# Patient Record
Sex: MALE | Race: WHITE | NOT HISPANIC OR LATINO | Employment: OTHER
[De-identification: names, ages, dates, MRNs, and addresses within clinical notes are randomized per-mention and may not be internally consistent; named-entity substitution may affect disease eponyms.]

---

## 2021-11-04 ENCOUNTER — TELEPHONE (OUTPATIENT)
Dept: MEDICAL GROUP | Facility: CLINIC | Age: 61
End: 2021-11-04

## 2021-11-05 NOTE — TELEPHONE ENCOUNTER
Caller Name: Hugo    Call Back Number: [phone] (home)       How would the patient prefer to be contacted with a response: Phone call OK to leave a detailed message    pt cr asking for a referral to a urologist. I attempted to cb. No answer.

## 2021-12-20 ENCOUNTER — TELEPHONE (OUTPATIENT)
Dept: SCHEDULING | Facility: IMAGING CENTER | Age: 61
End: 2021-12-20

## 2021-12-20 NOTE — TELEPHONE ENCOUNTER
I spoke with Hugo, and he last saw you on 9/28/21. During this visit you mentioned a referral to urology given elevated PSA. He has completed lab results that I have scanned into media manager. He would like a call back to see if he still needs to go to urology. If he does, he needs a new referral.

## 2021-12-30 ENCOUNTER — TELEPHONE (OUTPATIENT)
Dept: MEDICAL GROUP | Facility: CLINIC | Age: 61
End: 2021-12-30

## 2021-12-30 NOTE — TELEPHONE ENCOUNTER
2. SPECIFIC Action To Be Taken: Orders pending, please sign.    3. Diagnosis/Clinical Reason for Request: Elevated PSA history    4. Specialty & Provider Name/Lab/Imaging Location: Urology    5. Is appointment scheduled for requested order/referral: no    Patient was informed they will receive a return phone call from the office ONLY if there are any questions before processing their request. Advised to call back if they haven't received a call from the referral department in 5 days.

## 2022-01-05 NOTE — TELEPHONE ENCOUNTER
Hi Dr. Gould,     I still do not see a referral to urology for Hugo. Can you send this in, or should I call him and tell him he needs an appt with you to get this sent?     Thank you,   Krystyna

## 2022-01-10 DIAGNOSIS — R97.20 PSA ELEVATION: ICD-10-CM

## 2022-02-17 ENCOUNTER — HOSPITAL ENCOUNTER (OUTPATIENT)
Dept: RADIOLOGY | Facility: MEDICAL CENTER | Age: 62
End: 2022-02-17
Attending: UROLOGY
Payer: COMMERCIAL

## 2022-02-17 DIAGNOSIS — R97.20 ELEVATED PROSTATE SPECIFIC ANTIGEN (PSA): ICD-10-CM

## 2022-02-17 PROCEDURE — 700111 HCHG RX REV CODE 636 W/ 250 OVERRIDE (IP): Performed by: RADIOLOGY

## 2022-02-17 PROCEDURE — 700117 HCHG RX CONTRAST REV CODE 255: Performed by: UROLOGY

## 2022-02-17 PROCEDURE — 72197 MRI PELVIS W/O & W/DYE: CPT

## 2022-02-17 PROCEDURE — A9576 INJ PROHANCE MULTIPACK: HCPCS | Performed by: UROLOGY

## 2022-02-17 RX ADMIN — GLUCAGON 1 MG: 1 INJECTION, POWDER, LYOPHILIZED, FOR SOLUTION INTRAMUSCULAR; INTRAVENOUS at 09:15

## 2022-02-17 RX ADMIN — GADOTERIDOL 20 ML: 279.3 INJECTION, SOLUTION INTRAVENOUS at 09:50

## 2022-02-18 ENCOUNTER — HOSPITAL ENCOUNTER (OUTPATIENT)
Dept: LAB | Facility: MEDICAL CENTER | Age: 62
End: 2022-02-18
Attending: UROLOGY
Payer: COMMERCIAL

## 2022-02-18 LAB
APPEARANCE UR: CLEAR
BILIRUB UR QL STRIP.AUTO: NEGATIVE
BUN SERPL-MCNC: 22 MG/DL (ref 8–22)
CAOX CRY #/AREA URNS HPF: ABNORMAL /HPF
COLOR UR: YELLOW
CREAT SERPL-MCNC: 1 MG/DL (ref 0.5–1.4)
GLUCOSE UR STRIP.AUTO-MCNC: NEGATIVE MG/DL
KETONES UR STRIP.AUTO-MCNC: ABNORMAL MG/DL
LEUKOCYTE ESTERASE UR QL STRIP.AUTO: NEGATIVE
MICRO URNS: ABNORMAL
MUCOUS THREADS #/AREA URNS HPF: ABNORMAL /HPF
NITRITE UR QL STRIP.AUTO: NEGATIVE
PH UR STRIP.AUTO: 6.5 [PH] (ref 5–8)
PROT UR QL STRIP: NEGATIVE MG/DL
RBC # URNS HPF: ABNORMAL /HPF
RBC UR QL AUTO: ABNORMAL
SP GR UR STRIP.AUTO: 1.02
UNIDENT CRYS URNS QL MICRO: ABNORMAL /HPF
WBC #/AREA URNS HPF: ABNORMAL /HPF

## 2022-02-18 PROCEDURE — 36415 COLL VENOUS BLD VENIPUNCTURE: CPT

## 2022-02-18 PROCEDURE — 81001 URINALYSIS AUTO W/SCOPE: CPT

## 2022-02-18 PROCEDURE — 82565 ASSAY OF CREATININE: CPT

## 2022-02-18 PROCEDURE — 84520 ASSAY OF UREA NITROGEN: CPT

## 2022-02-18 PROCEDURE — 84153 ASSAY OF PSA TOTAL: CPT

## 2022-02-18 PROCEDURE — 84154 ASSAY OF PSA FREE: CPT

## 2022-02-24 ENCOUNTER — HOSPITAL ENCOUNTER (OUTPATIENT)
Dept: RADIOLOGY | Facility: MEDICAL CENTER | Age: 62
End: 2022-02-24
Attending: UROLOGY
Payer: COMMERCIAL

## 2022-02-24 DIAGNOSIS — R31.29 MICROSCOPIC HEMATURIA: ICD-10-CM

## 2022-02-24 PROCEDURE — 700117 HCHG RX CONTRAST REV CODE 255: Performed by: UROLOGY

## 2022-02-24 PROCEDURE — 74178 CT ABD&PLV WO CNTR FLWD CNTR: CPT

## 2022-02-24 RX ADMIN — IOHEXOL 100 ML: 350 INJECTION, SOLUTION INTRAVENOUS at 14:58

## 2022-03-04 ENCOUNTER — TELEPHONE (OUTPATIENT)
Dept: MEDICAL GROUP | Facility: CLINIC | Age: 62
End: 2022-03-04
Payer: COMMERCIAL

## 2022-03-04 NOTE — TELEPHONE ENCOUNTER
Hugo needs a back dated referral to be sent to his insurance. Can you place a referral to urology that states Dr. Manjit Arshad?     I will call [phone] (his insurance) to give it to them.

## 2022-03-06 DIAGNOSIS — R97.20 PSA ELEVATION: ICD-10-CM

## 2022-04-01 ENCOUNTER — APPOINTMENT (OUTPATIENT)
Dept: RADIOLOGY | Facility: MEDICAL CENTER | Age: 62
End: 2022-04-01
Attending: EMERGENCY MEDICINE
Payer: COMMERCIAL

## 2022-04-01 ENCOUNTER — APPOINTMENT (OUTPATIENT)
Dept: RADIOLOGY | Facility: MEDICAL CENTER | Age: 62
End: 2022-04-01
Payer: COMMERCIAL

## 2022-04-01 ENCOUNTER — HOSPITAL ENCOUNTER (EMERGENCY)
Facility: MEDICAL CENTER | Age: 62
End: 2022-04-01
Attending: EMERGENCY MEDICINE
Payer: COMMERCIAL

## 2022-04-01 VITALS
RESPIRATION RATE: 15 BRPM | WEIGHT: 220.24 LBS | BODY MASS INDEX: 35.4 KG/M2 | SYSTOLIC BLOOD PRESSURE: 123 MMHG | DIASTOLIC BLOOD PRESSURE: 76 MMHG | TEMPERATURE: 97.9 F | OXYGEN SATURATION: 94 % | HEIGHT: 66 IN | HEART RATE: 85 BPM

## 2022-04-01 DIAGNOSIS — Z87.19 HISTORY OF GASTROESOPHAGEAL REFLUX (GERD): ICD-10-CM

## 2022-04-01 DIAGNOSIS — R07.9 CHEST PAIN, UNSPECIFIED TYPE: ICD-10-CM

## 2022-04-01 DIAGNOSIS — R97.20 PSA ELEVATION: ICD-10-CM

## 2022-04-01 LAB
ALBUMIN SERPL BCP-MCNC: 4.5 G/DL (ref 3.2–4.9)
ALBUMIN/GLOB SERPL: 1.8 G/DL
ALP SERPL-CCNC: 87 U/L (ref 30–99)
ALT SERPL-CCNC: 27 U/L (ref 2–50)
ANION GAP SERPL CALC-SCNC: 9 MMOL/L (ref 7–16)
AST SERPL-CCNC: 20 U/L (ref 12–45)
BASOPHILS # BLD AUTO: 0.8 % (ref 0–1.8)
BASOPHILS # BLD: 0.07 K/UL (ref 0–0.12)
BILIRUB SERPL-MCNC: 0.4 MG/DL (ref 0.1–1.5)
BUN SERPL-MCNC: 15 MG/DL (ref 8–22)
CALCIUM SERPL-MCNC: 9.3 MG/DL (ref 8.5–10.5)
CHLORIDE SERPL-SCNC: 105 MMOL/L (ref 96–112)
CO2 SERPL-SCNC: 25 MMOL/L (ref 20–33)
CREAT SERPL-MCNC: 1.09 MG/DL (ref 0.5–1.4)
EKG IMPRESSION: NORMAL
EOSINOPHIL # BLD AUTO: 0.1 K/UL (ref 0–0.51)
EOSINOPHIL NFR BLD: 1.1 % (ref 0–6.9)
ERYTHROCYTE [DISTWIDTH] IN BLOOD BY AUTOMATED COUNT: 46.1 FL (ref 35.9–50)
GFR SERPLBLD CREATININE-BSD FMLA CKD-EPI: 76 ML/MIN/1.73 M 2
GLOBULIN SER CALC-MCNC: 2.5 G/DL (ref 1.9–3.5)
GLUCOSE SERPL-MCNC: 119 MG/DL (ref 65–99)
HCT VFR BLD AUTO: 42.6 % (ref 42–52)
HGB BLD-MCNC: 14.3 G/DL (ref 14–18)
IMM GRANULOCYTES # BLD AUTO: 0.03 K/UL (ref 0–0.11)
IMM GRANULOCYTES NFR BLD AUTO: 0.3 % (ref 0–0.9)
LYMPHOCYTES # BLD AUTO: 1.22 K/UL (ref 1–4.8)
LYMPHOCYTES NFR BLD: 13.6 % (ref 22–41)
MCH RBC QN AUTO: 29.4 PG (ref 27–33)
MCHC RBC AUTO-ENTMCNC: 33.6 G/DL (ref 33.7–35.3)
MCV RBC AUTO: 87.7 FL (ref 81.4–97.8)
MONOCYTES # BLD AUTO: 0.56 K/UL (ref 0–0.85)
MONOCYTES NFR BLD AUTO: 6.3 % (ref 0–13.4)
NEUTROPHILS # BLD AUTO: 6.98 K/UL (ref 1.82–7.42)
NEUTROPHILS NFR BLD: 77.9 % (ref 44–72)
NRBC # BLD AUTO: 0 K/UL
NRBC BLD-RTO: 0 /100 WBC
PLATELET # BLD AUTO: 239 K/UL (ref 164–446)
PMV BLD AUTO: 11.1 FL (ref 9–12.9)
POTASSIUM SERPL-SCNC: 3.8 MMOL/L (ref 3.6–5.5)
PROT SERPL-MCNC: 7 G/DL (ref 6–8.2)
RBC # BLD AUTO: 4.86 M/UL (ref 4.7–6.1)
SODIUM SERPL-SCNC: 139 MMOL/L (ref 135–145)
TROPONIN T SERPL-MCNC: 9 NG/L (ref 6–19)
WBC # BLD AUTO: 9 K/UL (ref 4.8–10.8)

## 2022-04-01 PROCEDURE — 99283 EMERGENCY DEPT VISIT LOW MDM: CPT

## 2022-04-01 PROCEDURE — 36415 COLL VENOUS BLD VENIPUNCTURE: CPT

## 2022-04-01 PROCEDURE — 93005 ELECTROCARDIOGRAM TRACING: CPT | Performed by: EMERGENCY MEDICINE

## 2022-04-01 PROCEDURE — 85025 COMPLETE CBC W/AUTO DIFF WBC: CPT

## 2022-04-01 PROCEDURE — 80053 COMPREHEN METABOLIC PANEL: CPT

## 2022-04-01 PROCEDURE — 71045 X-RAY EXAM CHEST 1 VIEW: CPT

## 2022-04-01 PROCEDURE — 84484 ASSAY OF TROPONIN QUANT: CPT

## 2022-04-01 PROCEDURE — 93005 ELECTROCARDIOGRAM TRACING: CPT

## 2022-04-01 RX ORDER — FAMOTIDINE 40 MG/1
40 TABLET, FILM COATED ORAL DAILY
Qty: 60 TABLET | Refills: 0 | Status: SHIPPED | OUTPATIENT
Start: 2022-04-01 | End: 2022-11-29

## 2022-04-01 RX ORDER — FINASTERIDE 5 MG/1
5 TABLET, FILM COATED ORAL DAILY
Status: SHIPPED | COMMUNITY
End: 2023-01-24

## 2022-04-01 RX ORDER — TAMSULOSIN HYDROCHLORIDE 0.4 MG/1
0.4 CAPSULE ORAL
Status: SHIPPED | COMMUNITY
Start: 2022-02-11 | End: 2022-11-29

## 2022-04-01 NOTE — ED TRIAGE NOTES
"Chief Complaint   Patient presents with   • Chest Pain     Pt states he went to  today for chest pain and \"burning sensation\" x2 weeks. Sent to ED for \"Lt Axis Deviation\" as noted on  EKG. Pt complains of no other symptoms at this time.     Pt educated upon triage process and told to inform  staff of any changes in condition so that Pt may be reassessed. No further questions at this time. Pt sitting out in lobby.    "

## 2022-04-01 NOTE — ED NOTES
Pt VSS, d/c instructions provided to pt. Pt verbalizes understanding and has no further questions. Pt ambulated with a steady gait to the lobby with wife.

## 2022-04-01 NOTE — ED PROVIDER NOTES
"ED Provider Note    ED Provider Note    Primary care provider: Bela Gould M.D.  Means of arrival: Walk-in  History obtained from: Patient    CHIEF COMPLAINT  Chief Complaint   Patient presents with   • Chest Pain     Pt states he went to  today for chest pain and \"burning sensation\" x2 weeks. Sent to ED for \"Lt Axis Deviation\" as noted on  EKG. Pt complains of no other symptoms at this time.     Seen at 2:28 PM.   HPI  Hugo Zaman is a 61 y.o. male with distant history of GERD and peptic ulcer disease presents today with 2 weeks of constant burning sensation.  He denies any positional changes, shortness of breath, fevers, chills, cough, numbness or focal weakness.  He did have an episode where he had a chronic cough a few years ago, this was evaluated by pulmonary and no etiology was found.  His primary care physician placed him on H2 blocker and the cough resolved.  The patient started taking Prevacid yesterday evening when he had no resolution of the burning.    He went to the urgent care today, they did an EKG and told him he had left axis deviation and that he should go to the emergency department.    REVIEW OF SYSTEMS  See HPI,   Remainder of ROS negative.     PAST MEDICAL HISTORY   Peptic ulcer disease    SURGICAL HISTORY  patient denies any surgical history    SOCIAL HISTORY      Social History     Substance and Sexual Activity   Drug Use Not on file       FAMILY HISTORY  No family history on file.    CURRENT MEDICATIONS  Reviewed.  See Encounter Summary.     ALLERGIES  No Known Allergies    PHYSICAL EXAM  VITAL SIGNS: /76   Pulse 85   Temp 36.6 °C (97.9 °F) (Temporal)   Resp 15   Ht 1.676 m (5' 6\")   Wt 99.9 kg (220 lb 3.8 oz)   SpO2 94%   BMI 35.55 kg/m²   Constitutional: Awake, alert in no apparent distress.  HENT: Normocephalic, Bilateral external ears normal. Nose normal.   Eyes: Conjunctiva normal, non-icteric, EOMI.    Thorax & Lungs: Easy unlabored respirations, Clear " to ascultation bilaterally.  Cardiovascular: Regular rate, Regular rhythm, No murmurs, rubs or gallops. Bilateral pulses symmetrical.   Abdomen:  Soft, nontender, nondistended, normal active bowel sounds.   :    Skin: Visualized skin is  Dry, No erythema, No rash.   Musculoskeletal:   No cyanosis, clubbing or edema. No leg asymmetry.   Neurologic: Alert, Grossly non-focal.   Psychiatric: Normal affect, Normal mood  Lymphatic:  No cervical LAD    EKG   12 lead Interpreted by me  Rhythm:  Normal sinus rhythm   Rate: 83  Axis: normal  Ectopy: none  Conduction: normal  ST Segments: no acute change  T Waves: no acute change  Clinical Impression: Normal EKG without acute changes     RADIOLOGY  DX-CHEST-PORTABLE (1 VIEW)   Final Result      No acute cardiopulmonary disease.            COURSE & MEDICAL DECISION MAKING  Pertinent Labs & Imaging studies reviewed. (See chart for details)    Differential diagnoses include but are not limited to: Most likely GERD/gastritis    2:28 PM - Medical record reviewed, patient seen and examined at bedside.    Decision Making:  This is a pleasant 61 y.o. year old male who presents with chest burning for the past 2 weeks, distant history of peptic ulcer disease and GERD.  EKG does not show acute ischemic changes, high-sensitivity troponin today is not elevated despite 2 weeks of symptoms.  Given that he does not have any other associated findings, ACS extremely unlikely at this time.  I do not feel that he requires further evaluation in the emergency department for this.  He is on appropriate treatment for GERD/gastritis.  Presentation today not concerning for acute pulmonary embolus or aortic dissection.    The patient should note significant improvement in the next few days with the above treatment.  If he has any other symptoms, particularly radiation of pain, shortness of breath, lightheadedness or nausea I do recommend he return for repeat evaluation.    Discharge  Medications:  Discharge Medication List as of 4/1/2022  2:59 PM      START taking these medications    Details   famotidine (PEPCID) 40 MG Tab Take 1 Tablet by mouth every day., Disp-60 Tablet, R-0, Normal             The patient was discharged home (see d/c instructions) was told to return immediately for any signs or symptoms listed, or any worsening at all.  The patient verbally agreed to the discharge precautions and follow-up plan which is documented in EPIC.        FINAL IMPRESSION  1. Chest pain, unspecified type    2. History of gastroesophageal reflux (GERD)

## 2022-04-21 LAB
PSA FREE MFR SERPL: 19 %
PSA FREE SERPL-MCNC: 1.3 NG/ML
PSA SERPL-MCNC: 6.8 NG/ML (ref 0–4)

## 2022-04-26 ENCOUNTER — OFFICE VISIT (OUTPATIENT)
Dept: MEDICAL GROUP | Facility: MEDICAL CENTER | Age: 62
End: 2022-04-26
Payer: COMMERCIAL

## 2022-04-26 VITALS
DIASTOLIC BLOOD PRESSURE: 60 MMHG | WEIGHT: 214 LBS | TEMPERATURE: 97.4 F | SYSTOLIC BLOOD PRESSURE: 122 MMHG | OXYGEN SATURATION: 95 % | BODY MASS INDEX: 34.39 KG/M2 | RESPIRATION RATE: 16 BRPM | HEIGHT: 66 IN | HEART RATE: 72 BPM

## 2022-04-26 DIAGNOSIS — K21.9 GASTROESOPHAGEAL REFLUX DISEASE, UNSPECIFIED WHETHER ESOPHAGITIS PRESENT: ICD-10-CM

## 2022-04-26 DIAGNOSIS — R73.02 IGT (IMPAIRED GLUCOSE TOLERANCE): ICD-10-CM

## 2022-04-26 DIAGNOSIS — N40.0 BENIGN PROSTATIC HYPERPLASIA WITHOUT LOWER URINARY TRACT SYMPTOMS: ICD-10-CM

## 2022-04-26 DIAGNOSIS — B37.2 INTERTRIGINOUS CANDIDIASIS: ICD-10-CM

## 2022-04-26 DIAGNOSIS — R97.20 ELEVATED PSA: ICD-10-CM

## 2022-04-26 DIAGNOSIS — E66.9 OBESITY (BMI 30.0-34.9): ICD-10-CM

## 2022-04-26 PROCEDURE — 99204 OFFICE O/P NEW MOD 45 MIN: CPT | Performed by: FAMILY MEDICINE

## 2022-04-26 RX ORDER — CLOTRIMAZOLE AND BETAMETHASONE DIPROPIONATE 10; .64 MG/G; MG/G
1 CREAM TOPICAL 2 TIMES DAILY
Qty: 45 G | Refills: 2 | Status: SHIPPED | OUTPATIENT
Start: 2022-04-26 | End: 2022-11-29

## 2022-04-26 ASSESSMENT — FIBROSIS 4 INDEX: FIB4 SCORE: 1.01

## 2022-04-26 ASSESSMENT — PATIENT HEALTH QUESTIONNAIRE - PHQ9: CLINICAL INTERPRETATION OF PHQ2 SCORE: 0

## 2022-04-26 NOTE — PROGRESS NOTES
CC: New patient: Acid reflux, BPH, intertrigo, elevated PSA    HPI:  Hugo presents today to establish new PCP.    Patient has been active and independent with all ADLs.  Has the following chronic medical issues:    Gastroesophageal reflux disease, unspecified whether esophagitis present  Patient with distant history of GERD and peptic ulcer disease had an episode of constant burning sensation for months.  He denies any positional changes, shortness of breath, fevers, chills, numbness or focal weakness, has been having cough once in a while.  He did have an episode where he had a chronic cough a few years ago, this was evaluated by pulmonary and no etiology was found.  His previous primary care physician in [place] placed him on H2 blocker and the cough resolved.  He has tried Pepcid but did not work.He went to the urgent care 3 weeks ago, they did an EKG and told him he had left axis deviation and that he should go to the ER.  He was seen at the ER and was worked out for cardiac causes and was negative, was placed on Prevacid and has been working.  He is currently on Prevacid 30 mg daily.  However patient stated that sometimes he still gets the burning sensation and mild cough.  Denies any hematemesis or hematochezia, denies any chest pain.    Benign prostatic hyperplasia without lower urinary tract symptoms  Patient has been doing fine on Flomax 0.4 mg daily and finasteride 5 mg daily.  Denies any urinary retention, or blood in the urine.    Intertriginous candidiasis  Has been having itchy erythematous rash in the groin area.  Previously he has tried clotrimazole/betamethasone has been working.  Needs refill    Elevated PSA  Patient has been asymptomatic, PSA has been slightly high.  Patient is currently following up with urology.  Scheduled for pelvic MRI    Impacted glucose tolerance  Blood glucose has been above 100, history of diabetes, not on medication.  Denies polyuria polydipsia    Obesity (BMI  30.0-34.9)  BMI is 34.5. The medical rationale for weight loss in obese individuals is that obesity is associated with a significant increase in mortality, and many health risks including type 2 diabetes mellitus, hypertension, dyslipidemia, and coronary heart disease. The benefits of weight loss include a reduction in the rate of progression from impaired glucose tolerance to diabetes, blood pressure in hypertensive patients, and lipid levels in higher risk patients. Other noncardiac benefits of weight loss include reductions in urinary incontinence, sleep apnea, and depression, and improvements in quality of life, physical functioning, and mobility.Recommend lifestyle modification: exercise 30 minutes per day 5 days per week. Recommend also portion control.      Patient had colonoscopy in 2013, was normal advised to repeat in 2023.    Patient Active Problem List    Diagnosis Date Noted   • Gastroesophageal reflux disease 04/26/2022   • Benign prostatic hyperplasia without lower urinary tract symptoms 04/26/2022   • Intertriginous candidiasis 04/26/2022   • Elevated PSA 04/26/2022       Current Outpatient Medications   Medication Sig Dispense Refill   • clotrimazole-betamethasone (LOTRISONE) 1-0.05 % Cream Apply 1 Application topically 2 times a day. 45 g 2   • tamsulosin (FLOMAX) 0.4 MG capsule Take 0.4 mg by mouth every day.     • finasteride (PROSCAR) 5 MG Tab Take 5 mg by mouth every day.     • famotidine (PEPCID) 40 MG Tab Take 1 Tablet by mouth every day. 60 Tablet 0     No current facility-administered medications for this visit.         Allergies as of 04/26/2022   • (No Known Allergies)        Social History     Socioeconomic History   • Marital status:      Spouse name: Not on file   • Number of children: Not on file   • Years of education: Not on file   • Highest education level: Not on file   Occupational History   • Not on file   Tobacco Use   • Smoking status: Former Smoker   • Smokeless  "tobacco: Never Used   Vaping Use   • Vaping Use: Never used   Substance and Sexual Activity   • Alcohol use: Yes     Alcohol/week: 0.6 oz     Types: 1 Glasses of wine per week   • Drug use: Never   • Sexual activity: Not on file   Other Topics Concern   • Not on file   Social History Narrative   • Not on file     Social Determinants of Health     Financial Resource Strain: Not on file   Food Insecurity: Not on file   Transportation Needs: Not on file   Physical Activity: Not on file   Stress: Not on file   Social Connections: Not on file   Intimate Partner Violence: Not on file   Housing Stability: Not on file       History reviewed. No pertinent family history.    History reviewed. No pertinent surgical history.    ROS:  Denies any Headache, Blurred Vision, Confusion Chest pain,  Shortness of breath,  Abdominal pain, Changes of bowel or bladder, Lower ext edema, Fevers, Nights sweats, Weight Changes, Focal weakness or numbness.  All other systems are negative.    /60 (BP Location: Right arm, Patient Position: Sitting, BP Cuff Size: Adult)   Pulse 72   Temp 36.3 °C (97.4 °F) (Temporal)   Resp 16   Ht 1.676 m (5' 6\") Comment: patient stated  Wt 97.1 kg (214 lb)   SpO2 95%   BMI 34.54 kg/m²     Physical Exam:  Gen:         Alert and oriented, No apparent distress.  HEENT:   Perrla, TM clear,  Oralpharynx no erythema or exudates.  Neck:       No Jugular venous distension, Lymphadenopathy, Thyromegaly, Bruits.  Lungs:     Clear to auscultation bilaterally  CV:          Regular rate and rhythm. No murmurs, rubs or gallops.  Abd:         Soft non tender, non distended. Normal active bowel sounds. No                                        Hepatosplenomegaly, No pulsatile masses.  Ext:          No clubbing, cyanosis, edema.      Assessment and Plan.   61 y.o. male     1. Gastroesophageal reflux disease, unspecified whether esophagitis present  Has been feeling better with Prevacid however he gets mild symptoms " here and there.  Advised to be seen by GI for possible evaluation with EGD.    - Referral to Gastroenterology    2. Benign prostatic hyperplasia without lower urinary tract symptoms  Has been doing fine on Flomax and finasteride    3. Intertriginous candidiasis  Has tried clotrimazole/betamethasone has been working.  Needs refill  Advised to keep the area clean and dry.    - clotrimazole-betamethasone (LOTRISONE) 1-0.05 % Cream; Apply 1 Application topically 2 times a day.  Dispense: 45 g; Refill: 2    4. Elevated PSA  Has been slightly high.  Patient is currently following up with urology.  Scheduled for pelvic MRI    5. Impacted glucose tolerance  Blood glucose has been above 100, history of diabetes, not on medication  Patient is counseled on lifestyle modification  We will continue to monitor blood glucose    6. Obesity (BMI 30.0-34.9)  BMI is 34.5  Patient is counseled on lifestyle modification

## 2022-05-09 ENCOUNTER — PATIENT MESSAGE (OUTPATIENT)
Dept: MEDICAL GROUP | Facility: MEDICAL CENTER | Age: 62
End: 2022-05-09
Payer: COMMERCIAL

## 2022-05-24 ENCOUNTER — TELEPHONE (OUTPATIENT)
Dept: MEDICAL GROUP | Facility: MEDICAL CENTER | Age: 62
End: 2022-05-24

## 2022-06-08 ENCOUNTER — OFFICE VISIT (OUTPATIENT)
Dept: MEDICAL GROUP | Facility: MEDICAL CENTER | Age: 62
End: 2022-06-08
Payer: COMMERCIAL

## 2022-06-08 ENCOUNTER — HOSPITAL ENCOUNTER (OUTPATIENT)
Dept: RADIOLOGY | Facility: MEDICAL CENTER | Age: 62
End: 2022-06-08
Attending: FAMILY MEDICINE
Payer: COMMERCIAL

## 2022-06-08 VITALS
SYSTOLIC BLOOD PRESSURE: 124 MMHG | BODY MASS INDEX: 33.59 KG/M2 | DIASTOLIC BLOOD PRESSURE: 72 MMHG | HEART RATE: 98 BPM | WEIGHT: 209 LBS | HEIGHT: 66 IN | TEMPERATURE: 98.2 F | OXYGEN SATURATION: 96 % | RESPIRATION RATE: 16 BRPM

## 2022-06-08 DIAGNOSIS — R05.9 COUGH: ICD-10-CM

## 2022-06-08 DIAGNOSIS — K21.9 GASTROESOPHAGEAL REFLUX DISEASE, UNSPECIFIED WHETHER ESOPHAGITIS PRESENT: ICD-10-CM

## 2022-06-08 PROCEDURE — 99214 OFFICE O/P EST MOD 30 MIN: CPT | Performed by: FAMILY MEDICINE

## 2022-06-08 PROCEDURE — 71046 X-RAY EXAM CHEST 2 VIEWS: CPT

## 2022-06-08 RX ORDER — BENZONATATE 100 MG/1
100 CAPSULE ORAL 3 TIMES DAILY PRN
Qty: 20 CAPSULE | Refills: 0 | Status: SHIPPED | OUTPATIENT
Start: 2022-06-08 | End: 2022-11-29

## 2022-06-08 RX ORDER — LANSOPRAZOLE 30 MG/1
30 CAPSULE, DELAYED RELEASE ORAL DAILY
Qty: 90 CAPSULE | Refills: 2 | Status: SHIPPED | OUTPATIENT
Start: 2022-06-08 | End: 2022-11-29

## 2022-06-08 ASSESSMENT — FIBROSIS 4 INDEX: FIB4 SCORE: 1.01

## 2022-06-08 NOTE — PROGRESS NOTES
"CC: Cough    HPI:   Hugo presents today because he still have some on cough on and off.  Patient was diagnosed with COVID on 5/23, he gets over it, was tested negative 3 days ago.  However he still have some annoying  random cough on and off. But he denies any fever, chills, or shortness of breath.  Patient has history of significant GERD for which she take famotidine.  However he wants to try lansoprazole instead.  Patient is scheduled for EGD for an evaluation of his GERD, but he just wanted to make sure the coughing is not due to lung issue.      Patient Active Problem List    Diagnosis Date Noted   • Gastroesophageal reflux disease 04/26/2022   • Benign prostatic hyperplasia without lower urinary tract symptoms 04/26/2022   • Intertriginous candidiasis 04/26/2022   • Elevated PSA 04/26/2022   • IGT (impaired glucose tolerance) 04/26/2022   • Obesity (BMI 30.0-34.9) 04/26/2022       Current Outpatient Medications   Medication Sig Dispense Refill   • lansoprazole (PREVACID) 30 MG CAPSULE DELAYED RELEASE Take 1 Capsule by mouth every day. 90 Capsule 2   • benzonatate (TESSALON) 100 MG Cap Take 1 Capsule by mouth 3 times a day as needed for Cough. 20 Capsule 0   • clotrimazole-betamethasone (LOTRISONE) 1-0.05 % Cream Apply 1 Application topically 2 times a day. 45 g 2   • tamsulosin (FLOMAX) 0.4 MG capsule Take 0.4 mg by mouth every day.     • finasteride (PROSCAR) 5 MG Tab Take 5 mg by mouth every day.     • famotidine (PEPCID) 40 MG Tab Take 1 Tablet by mouth every day. 60 Tablet 0     No current facility-administered medications for this visit.         Allergies as of 06/08/2022   • (No Known Allergies)        ROS: Denies any chest pain, Shortness of breath, Changes bowel or bladder, Lower extremity edema.    Physical Exam:  /72 (BP Location: Right arm, Patient Position: Sitting, BP Cuff Size: Adult)   Pulse 98   Temp 36.8 °C (98.2 °F) (Temporal)   Resp 16   Ht 1.676 m (5' 6\")   Wt 94.8 kg (209 lb)  "  SpO2 96%   BMI 33.73 kg/m²   Gen.: Well-developed, well-nourished, no apparent distress,pleasant and cooperative with the examination  Skin:  Warm and dry with good turgor. No rashes or suspicious lesions in visible areas  HEENT:Sinuses nontender with palpation, TMs clear, nares patent with pink mucosa and clear rhinorrhea,no septal deviation ,polyps or lesions. lips without lesions, oropharynx clear.  Neck: Trachea midline,no masses or adenopathy. No JVD.  Cor: Regular rate and rhythm without murmur, gallop or rub.  Lungs: Respirations unlabored.Clear to auscultation with equal breath sounds bilaterally. No wheezes, rhonchi.  Extremities: No cyanosis, clubbing or edema.      Assessment and Plan.   61 y.o. male     1. Cough  Possibly it is a remnant of COVID.  We will send patient for chest x-ray  GERD is another possibility.  We will start patient on lansoprazole which has helped in the past.  Advised to stop taking famotidine until he sees the GI.  - DX-CHEST-2 VIEWS; Future  - benzonatate (TESSALON) 100 MG Cap; Take 1 Capsule by mouth 3 times a day as needed for Cough.  Dispense: 20 Capsule; Refill: 0    2. Gastroesophageal reflux disease, unspecified whether esophagitis present  It is another possibility for his cough.    I  will start patient on lansoprazole which has helped in the past.  Advised to stop taking famotidine until he sees the GI.  Continue follow-up with GI    - lansoprazole (PREVACID) 30 MG CAPSULE DELAYED RELEASE; Take 1 Capsule by mouth every day.  Dispense: 90 Capsule; Refill: 2

## 2022-07-20 ENCOUNTER — TELEPHONE (OUTPATIENT)
Dept: MEDICAL GROUP | Facility: MEDICAL CENTER | Age: 62
End: 2022-07-20
Payer: COMMERCIAL

## 2022-07-20 NOTE — TELEPHONE ENCOUNTER
Patient needs referrals back dated to  6/27/22 for Dr. Seferino Lynch and Dr. Bismark Rivera for 7/6/22. Please advise

## 2022-08-24 ENCOUNTER — TELEPHONE (OUTPATIENT)
Dept: MEDICAL GROUP | Facility: MEDICAL CENTER | Age: 62
End: 2022-08-24
Payer: COMMERCIAL

## 2022-08-24 NOTE — TELEPHONE ENCOUNTER
We can not do a back dated referrals.  The system will not let me do that.  However I see that she had a referral to GI on 6/9/2022

## 2022-09-06 ENCOUNTER — HOSPITAL ENCOUNTER (OUTPATIENT)
Dept: RADIOLOGY | Facility: MEDICAL CENTER | Age: 62
End: 2022-09-06
Attending: UROLOGY
Payer: COMMERCIAL

## 2022-09-06 DIAGNOSIS — N28.89 URETERAL FISTULA: ICD-10-CM

## 2022-09-06 PROCEDURE — 76775 US EXAM ABDO BACK WALL LIM: CPT

## 2022-11-17 ENCOUNTER — HOSPITAL ENCOUNTER (OUTPATIENT)
Dept: RADIOLOGY | Facility: MEDICAL CENTER | Age: 62
End: 2022-11-17
Payer: COMMERCIAL

## 2022-11-17 DIAGNOSIS — R13.10 DYSPHAGIA, UNSPECIFIED TYPE: ICD-10-CM

## 2022-11-17 DIAGNOSIS — R05.9 COUGH, UNSPECIFIED TYPE: ICD-10-CM

## 2022-11-17 PROCEDURE — 74230 X-RAY XM SWLNG FUNCJ C+: CPT

## 2022-11-17 PROCEDURE — 92611 MOTION FLUOROSCOPY/SWALLOW: CPT

## 2022-11-17 NOTE — OP THERAPY EVALUATION
[facility] Therapy Services  Outpatient Modified Barium Swallow Study        Patient Name: Hugo Zaman  Date of Evaluation: 11/17/22  Referring Provider: Shannon Castillo NP  Fax: [phone]      Patient History & Reason for Referral  The pt is a 63 y/o M who was referred for a Modified Barium Swallow Study (MBSS) due to idiopathic chest congestion. Pt reported that a recent EGD was unremarkable. Pt denied any issues with swallowing foods/liquids    PMHx:  GERD    Level of Consciousness: Alert, Awake  Affect/Behavior: Appropriate, Calm, Cooperative  Follows Directives: Yes  Orientation: Oriented x 4  Hearing: Functional hearing  Vision: Functional vision    Oral Mechanism Evaluation  Facial Symmetry: Equal  Facial Sensation: Equal  Labial Observations: WFL  Lingual Observations: Midline  Dentition: Good, Natural dentition  Comments:    Voice  Quality: WFL  Resonance: WFL  Intensity: Appropriate  Cough: WFL  Comments:    Pertinent Information  Oxygen Requirements: Room Air  Secretion Management: WNL  Factor(s) Affecting Performance: None    Functional Oral Intake Scale (FOIS)  Level 7 - Total oral diet with no restrictions      Subjective  Pt was pleasant and cooperative. He arrived unaccompanied and was ambulatory.      Discussed with the risks, benefits, and alternatives of the MBSS procedure. Patient/family acknowledged and agreed to proceed.    Assessment  Videofluoroscopic Swallow Study was conducted in the lateral and AP projection(s) to evaluate oropharyngeal swallow function. A radiology tech was present to assist with the procedure.     Positioning: seated upright in fluoroscopy chair, standing (AP view)  Anatomic View: WNL  Bolus Administration: Patient  PO barium contrast trials: Varibar thin liquid, Varibar nectar (mildly thick) liquid, Varibar pudding, solid coated in Varibar pudding      Consistency PAS Score Timing Comments   Thin Liquid 1 N/A Spoon, Cup (single, serial), Straw (single,  serial)   Mildly Thick Liquid 1 N/A Cup   Pudding 1 N/A    Solid 1 N/A      Penetration-Aspiration Scale  1     No contrast enters airway  2     Contrast enters the airway, remains above the vocal folds, and is ejected from the airway (not seen in the airway at the end of the swallow).  3     Contrast enters the airway, remains above the vocal folds, and is not ejected from the airway (is seen in the airway after the swallow).  4     Contrast enters the airway, contacts the vocal folds, and is ejected from the airway.  5     Contrast enters the airway, contacts the vocal folds, and is not ejected from the airway  6     Contrast enters the airway, crosses the plane of the vocal folds, and is ejected from the airway.  7     Contrast enters the airway, crosses the plane of the vocal folds, and is not ejected from the airway despite effort.  8     Contrast enters the airway, crosses the plane of the vocal folds, is not ejected from the airway and there is no response to aspiration.      Oral phase:  Appropriate oral acceptance and sufficient containment. Adequate bite and mastication of the regular solids. Good oral hold. Brisk lingual motion and oral manipulation. Complete oral clearance.       Pharyngeal phase:  Timely pharyngeal onset. No penetration nor aspiration appreciated. Complete pharyngeal clearance.      Esophageal phase:  AP views obtained with thin liquids by straw, pudding and regular consistencies. No retention nor retrograde movement appreciated. Esophageal screen was unremarkable.      Compensatory Strategies:  None indicated      Clinical Impressions  The pt presents with a functional oropharyngeal swallow. All anatomic and physiologic domains of the oral and pharyngeal phases of swallowing appear within normal limits. Oropharyngeal clearance and prandial airway protection are intact across all consistencies under typical eating behaviors/conditions. Aspiration risk from oral intake is low without  indication for a modified diet at this time.       Recommendations  Regular solids (RG7), Thin liquids (TN0)  2.  Swallowing Instructions & Precautions:   Supervision: Independent  Positioning: Fully upright and midline during oral intake  Medication: Whole with liquid  Strategies: None  Oral Care: BID  3.  Specialist Referral(s): None  4.  No further speech therapy services appear indicated at this time.      Results and recs d/w the patient, who verbalized understanding. Thank you.  Christine Baker, SLP

## 2022-11-29 ENCOUNTER — PRE-ADMISSION TESTING (OUTPATIENT)
Dept: ADMISSIONS | Facility: MEDICAL CENTER | Age: 62
End: 2022-11-29
Attending: UROLOGY
Payer: COMMERCIAL

## 2022-11-29 DIAGNOSIS — Z01.812 PRE-OPERATIVE LABORATORY EXAMINATION: ICD-10-CM

## 2022-11-29 LAB
ANION GAP SERPL CALC-SCNC: 12 MMOL/L (ref 7–16)
APPEARANCE UR: CLEAR
BACTERIA #/AREA URNS HPF: ABNORMAL /HPF
BILIRUB UR QL STRIP.AUTO: NEGATIVE
BUN SERPL-MCNC: 18 MG/DL (ref 8–22)
CALCIUM SERPL-MCNC: 9.1 MG/DL (ref 8.4–10.2)
CHLORIDE SERPL-SCNC: 106 MMOL/L (ref 96–112)
CO2 SERPL-SCNC: 22 MMOL/L (ref 20–33)
COLOR UR: YELLOW
CREAT SERPL-MCNC: 0.9 MG/DL (ref 0.5–1.4)
EPI CELLS #/AREA URNS HPF: ABNORMAL /HPF
ERYTHROCYTE [DISTWIDTH] IN BLOOD BY AUTOMATED COUNT: 46.8 FL (ref 35.9–50)
GFR SERPLBLD CREATININE-BSD FMLA CKD-EPI: 95 ML/MIN/1.73 M 2
GLUCOSE SERPL-MCNC: 132 MG/DL (ref 65–99)
GLUCOSE UR STRIP.AUTO-MCNC: NEGATIVE MG/DL
HCT VFR BLD AUTO: 44.5 % (ref 42–52)
HGB BLD-MCNC: 14.9 G/DL (ref 14–18)
KETONES UR STRIP.AUTO-MCNC: NEGATIVE MG/DL
LEUKOCYTE ESTERASE UR QL STRIP.AUTO: NEGATIVE
MCH RBC QN AUTO: 29.9 PG (ref 27–33)
MCHC RBC AUTO-ENTMCNC: 33.5 G/DL (ref 33.7–35.3)
MCV RBC AUTO: 89.4 FL (ref 81.4–97.8)
MICRO URNS: ABNORMAL
MUCOUS THREADS #/AREA URNS HPF: ABNORMAL /HPF
NITRITE UR QL STRIP.AUTO: NEGATIVE
PH UR STRIP.AUTO: 6.5 [PH] (ref 5–8)
PLATELET # BLD AUTO: 243 K/UL (ref 164–446)
PMV BLD AUTO: 11.6 FL (ref 9–12.9)
POTASSIUM SERPL-SCNC: 3.7 MMOL/L (ref 3.6–5.5)
PROT UR QL STRIP: NEGATIVE MG/DL
RBC # BLD AUTO: 4.98 M/UL (ref 4.7–6.1)
RBC # URNS HPF: ABNORMAL /HPF
RBC UR QL AUTO: ABNORMAL
SODIUM SERPL-SCNC: 140 MMOL/L (ref 135–145)
SP GR UR STRIP.AUTO: 1.01
WBC # BLD AUTO: 7.1 K/UL (ref 4.8–10.8)
WBC #/AREA URNS HPF: ABNORMAL /HPF

## 2022-11-29 PROCEDURE — 87186 SC STD MICRODIL/AGAR DIL: CPT

## 2022-11-29 PROCEDURE — 80048 BASIC METABOLIC PNL TOTAL CA: CPT

## 2022-11-29 PROCEDURE — 87086 URINE CULTURE/COLONY COUNT: CPT

## 2022-11-29 PROCEDURE — 36415 COLL VENOUS BLD VENIPUNCTURE: CPT

## 2022-11-29 PROCEDURE — 87077 CULTURE AEROBIC IDENTIFY: CPT

## 2022-11-29 PROCEDURE — 85027 COMPLETE CBC AUTOMATED: CPT

## 2022-11-29 PROCEDURE — 81001 URINALYSIS AUTO W/SCOPE: CPT

## 2022-11-29 ASSESSMENT — FIBROSIS 4 INDEX: FIB4 SCORE: 1.03

## 2022-11-29 NOTE — PREPROCEDURE INSTRUCTIONS
Pre admit apt: Pt. Instructed to continue regularly prescribed medications through day before surgery.  Instructed to take the following medications, the day of surgery, with a sip of water per anesthesia protocol: proscar  METS greater than 4  Pt states with his hernia operation, he was told by a nurse he was a difficult intubation.

## 2022-11-29 NOTE — OR NURSING
Good afternoon,    This patient should discuss this with his assigned anesthesiologist on the day of surgery as we have no record and cannot assume anything regarding this important issue. Otherwise, nothing further to add. Thank you.    Vinicius Abbott M.D.        On Nov 29, 2022, at 12:48, Zita Fuentes [email or web address] wrote:  ?   Helsaeed Abbott, this patient stated he was once told by a nurse that he was a difficult intubation.  This was when he lived in [place], in 2003, with his hernia surgery.  He has had surgeries since then, and reports no problems.  I looked under the tab- Care everywhere, but no records regarding this.          Anesthesia Summary Report           Patient Name: Hugo Zaman MRN: [id number] Admission Date: Patient not admitted

## 2022-12-02 LAB
BACTERIA UR CULT: ABNORMAL
BACTERIA UR CULT: ABNORMAL
SIGNIFICANT IND 70042: ABNORMAL
SITE SITE: ABNORMAL
SOURCE SOURCE: ABNORMAL

## 2022-12-15 ENCOUNTER — HOSPITAL ENCOUNTER (OUTPATIENT)
Facility: MEDICAL CENTER | Age: 62
End: 2022-12-17
Attending: UROLOGY | Admitting: UROLOGY
Payer: COMMERCIAL

## 2022-12-15 ENCOUNTER — ANESTHESIA (OUTPATIENT)
Dept: SURGERY | Facility: MEDICAL CENTER | Age: 62
End: 2022-12-15
Payer: COMMERCIAL

## 2022-12-15 ENCOUNTER — ANESTHESIA EVENT (OUTPATIENT)
Dept: SURGERY | Facility: MEDICAL CENTER | Age: 62
End: 2022-12-15
Payer: COMMERCIAL

## 2022-12-15 PROBLEM — N32.0 BLADDER OUTLET OBSTRUCTION: Status: ACTIVE | Noted: 2022-12-15

## 2022-12-15 LAB
ANION GAP SERPL CALC-SCNC: 9 MMOL/L (ref 7–16)
BUN SERPL-MCNC: 13 MG/DL (ref 8–22)
CALCIUM SERPL-MCNC: 8.6 MG/DL (ref 8.4–10.2)
CHLORIDE SERPL-SCNC: 105 MMOL/L (ref 96–112)
CO2 SERPL-SCNC: 25 MMOL/L (ref 20–33)
CREAT SERPL-MCNC: 1 MG/DL (ref 0.5–1.4)
ERYTHROCYTE [DISTWIDTH] IN BLOOD BY AUTOMATED COUNT: 46.1 FL (ref 35.9–50)
GFR SERPLBLD CREATININE-BSD FMLA CKD-EPI: 84 ML/MIN/1.73 M 2
GLUCOSE SERPL-MCNC: 92 MG/DL (ref 65–99)
HCT VFR BLD AUTO: 41.9 % (ref 42–52)
HGB BLD-MCNC: 14 G/DL (ref 14–18)
MCH RBC QN AUTO: 30.2 PG (ref 27–33)
MCHC RBC AUTO-ENTMCNC: 33.4 G/DL (ref 33.7–35.3)
MCV RBC AUTO: 90.5 FL (ref 81.4–97.8)
PATHOLOGY CONSULT NOTE: NORMAL
PLATELET # BLD AUTO: 193 K/UL (ref 164–446)
PMV BLD AUTO: 11.3 FL (ref 9–12.9)
POTASSIUM SERPL-SCNC: 4.1 MMOL/L (ref 3.6–5.5)
RBC # BLD AUTO: 4.63 M/UL (ref 4.7–6.1)
SODIUM SERPL-SCNC: 139 MMOL/L (ref 135–145)
WBC # BLD AUTO: 6.7 K/UL (ref 4.8–10.8)

## 2022-12-15 PROCEDURE — 96376 TX/PRO/DX INJ SAME DRUG ADON: CPT

## 2022-12-15 PROCEDURE — 700102 HCHG RX REV CODE 250 W/ 637 OVERRIDE(OP): Performed by: ANESTHESIOLOGY

## 2022-12-15 PROCEDURE — 00914 ANES TRURL PX RESCJ PRST8: CPT | Performed by: ANESTHESIOLOGY

## 2022-12-15 PROCEDURE — A9270 NON-COVERED ITEM OR SERVICE: HCPCS | Performed by: UROLOGY

## 2022-12-15 PROCEDURE — 160035 HCHG PACU - 1ST 60 MINS PHASE I: Performed by: UROLOGY

## 2022-12-15 PROCEDURE — 160028 HCHG SURGERY MINUTES - 1ST 30 MINS LEVEL 3: Performed by: UROLOGY

## 2022-12-15 PROCEDURE — 700102 HCHG RX REV CODE 250 W/ 637 OVERRIDE(OP): Performed by: UROLOGY

## 2022-12-15 PROCEDURE — 160036 HCHG PACU - EA ADDL 30 MINS PHASE I: Performed by: UROLOGY

## 2022-12-15 PROCEDURE — G0378 HOSPITAL OBSERVATION PER HR: HCPCS

## 2022-12-15 PROCEDURE — A9270 NON-COVERED ITEM OR SERVICE: HCPCS | Performed by: ANESTHESIOLOGY

## 2022-12-15 PROCEDURE — 96365 THER/PROPH/DIAG IV INF INIT: CPT

## 2022-12-15 PROCEDURE — 88305 TISSUE EXAM BY PATHOLOGIST: CPT

## 2022-12-15 PROCEDURE — 700111 HCHG RX REV CODE 636 W/ 250 OVERRIDE (IP): Performed by: UROLOGY

## 2022-12-15 PROCEDURE — 160039 HCHG SURGERY MINUTES - EA ADDL 1 MIN LEVEL 3: Performed by: UROLOGY

## 2022-12-15 PROCEDURE — 700101 HCHG RX REV CODE 250: Performed by: ANESTHESIOLOGY

## 2022-12-15 PROCEDURE — 80048 BASIC METABOLIC PNL TOTAL CA: CPT

## 2022-12-15 PROCEDURE — 94760 N-INVAS EAR/PLS OXIMETRY 1: CPT

## 2022-12-15 PROCEDURE — 700105 HCHG RX REV CODE 258: Performed by: UROLOGY

## 2022-12-15 PROCEDURE — 85027 COMPLETE CBC AUTOMATED: CPT

## 2022-12-15 PROCEDURE — 700111 HCHG RX REV CODE 636 W/ 250 OVERRIDE (IP): Performed by: ANESTHESIOLOGY

## 2022-12-15 PROCEDURE — 96375 TX/PRO/DX INJ NEW DRUG ADDON: CPT

## 2022-12-15 PROCEDURE — 160048 HCHG OR STATISTICAL LEVEL 1-5: Performed by: UROLOGY

## 2022-12-15 PROCEDURE — 36415 COLL VENOUS BLD VENIPUNCTURE: CPT

## 2022-12-15 PROCEDURE — 160009 HCHG ANES TIME/MIN: Performed by: UROLOGY

## 2022-12-15 PROCEDURE — 160002 HCHG RECOVERY MINUTES (STAT): Performed by: UROLOGY

## 2022-12-15 RX ORDER — HALOPERIDOL 5 MG/ML
1 INJECTION INTRAMUSCULAR
Status: DISCONTINUED | OUTPATIENT
Start: 2022-12-15 | End: 2022-12-15 | Stop reason: HOSPADM

## 2022-12-15 RX ORDER — ONDANSETRON 2 MG/ML
4 INJECTION INTRAMUSCULAR; INTRAVENOUS
Status: DISCONTINUED | OUTPATIENT
Start: 2022-12-15 | End: 2022-12-15 | Stop reason: HOSPADM

## 2022-12-15 RX ORDER — KETOROLAC TROMETHAMINE 30 MG/ML
INJECTION, SOLUTION INTRAMUSCULAR; INTRAVENOUS PRN
Status: DISCONTINUED | OUTPATIENT
Start: 2022-12-15 | End: 2022-12-15 | Stop reason: SURG

## 2022-12-15 RX ORDER — HYDROMORPHONE HYDROCHLORIDE 1 MG/ML
0.25 INJECTION, SOLUTION INTRAMUSCULAR; INTRAVENOUS; SUBCUTANEOUS
Status: DISCONTINUED | OUTPATIENT
Start: 2022-12-15 | End: 2022-12-17 | Stop reason: HOSPADM

## 2022-12-15 RX ORDER — PHENAZOPYRIDINE HYDROCHLORIDE 200 MG/1
200 TABLET, FILM COATED ORAL
Status: COMPLETED | OUTPATIENT
Start: 2022-12-15 | End: 2022-12-15

## 2022-12-15 RX ORDER — OXYCODONE HYDROCHLORIDE 5 MG/1
5 TABLET ORAL
Status: DISCONTINUED | OUTPATIENT
Start: 2022-12-15 | End: 2022-12-17 | Stop reason: HOSPADM

## 2022-12-15 RX ORDER — ONDANSETRON 2 MG/ML
4 INJECTION INTRAMUSCULAR; INTRAVENOUS EVERY 4 HOURS PRN
Status: DISCONTINUED | OUTPATIENT
Start: 2022-12-15 | End: 2022-12-17 | Stop reason: HOSPADM

## 2022-12-15 RX ORDER — LABETALOL HYDROCHLORIDE 5 MG/ML
5 INJECTION, SOLUTION INTRAVENOUS
Status: DISCONTINUED | OUTPATIENT
Start: 2022-12-15 | End: 2022-12-15 | Stop reason: HOSPADM

## 2022-12-15 RX ORDER — HYDRALAZINE HYDROCHLORIDE 20 MG/ML
5 INJECTION INTRAMUSCULAR; INTRAVENOUS
Status: DISCONTINUED | OUTPATIENT
Start: 2022-12-15 | End: 2022-12-15 | Stop reason: HOSPADM

## 2022-12-15 RX ORDER — SODIUM CHLORIDE, SODIUM LACTATE, POTASSIUM CHLORIDE, CALCIUM CHLORIDE 600; 310; 30; 20 MG/100ML; MG/100ML; MG/100ML; MG/100ML
INJECTION, SOLUTION INTRAVENOUS CONTINUOUS
Status: DISCONTINUED | OUTPATIENT
Start: 2022-12-15 | End: 2022-12-15 | Stop reason: HOSPADM

## 2022-12-15 RX ORDER — CEFAZOLIN SODIUM 1 G/3ML
INJECTION, POWDER, FOR SOLUTION INTRAMUSCULAR; INTRAVENOUS PRN
Status: DISCONTINUED | OUTPATIENT
Start: 2022-12-15 | End: 2022-12-15 | Stop reason: SURG

## 2022-12-15 RX ORDER — ACETAMINOPHEN 500 MG
1000 TABLET ORAL EVERY 6 HOURS PRN
Status: DISCONTINUED | OUTPATIENT
Start: 2022-12-20 | End: 2022-12-17 | Stop reason: HOSPADM

## 2022-12-15 RX ORDER — OXYCODONE HYDROCHLORIDE 5 MG/1
2.5 TABLET ORAL
Status: DISCONTINUED | OUTPATIENT
Start: 2022-12-15 | End: 2022-12-17 | Stop reason: HOSPADM

## 2022-12-15 RX ORDER — DEXAMETHASONE SODIUM PHOSPHATE 4 MG/ML
INJECTION, SOLUTION INTRA-ARTICULAR; INTRALESIONAL; INTRAMUSCULAR; INTRAVENOUS; SOFT TISSUE PRN
Status: DISCONTINUED | OUTPATIENT
Start: 2022-12-15 | End: 2022-12-15 | Stop reason: SURG

## 2022-12-15 RX ORDER — ACETAMINOPHEN 500 MG
1000 TABLET ORAL EVERY 6 HOURS
Status: DISCONTINUED | OUTPATIENT
Start: 2022-12-15 | End: 2022-12-17 | Stop reason: HOSPADM

## 2022-12-15 RX ORDER — OXYCODONE HCL 5 MG/5 ML
5 SOLUTION, ORAL ORAL
Status: COMPLETED | OUTPATIENT
Start: 2022-12-15 | End: 2022-12-15

## 2022-12-15 RX ORDER — PHENYLEPHRINE HCL IN 0.9% NACL 0.5 MG/5ML
SYRINGE (ML) INTRAVENOUS PRN
Status: DISCONTINUED | OUTPATIENT
Start: 2022-12-15 | End: 2022-12-15 | Stop reason: SURG

## 2022-12-15 RX ORDER — LIDOCAINE HYDROCHLORIDE 20 MG/ML
INJECTION, SOLUTION EPIDURAL; INFILTRATION; INTRACAUDAL; PERINEURAL PRN
Status: DISCONTINUED | OUTPATIENT
Start: 2022-12-15 | End: 2022-12-15 | Stop reason: SURG

## 2022-12-15 RX ORDER — DIPHENHYDRAMINE HYDROCHLORIDE 50 MG/ML
12.5 INJECTION INTRAMUSCULAR; INTRAVENOUS
Status: DISCONTINUED | OUTPATIENT
Start: 2022-12-15 | End: 2022-12-15 | Stop reason: HOSPADM

## 2022-12-15 RX ORDER — HYDROMORPHONE HYDROCHLORIDE 1 MG/ML
0.4 INJECTION, SOLUTION INTRAMUSCULAR; INTRAVENOUS; SUBCUTANEOUS
Status: DISCONTINUED | OUTPATIENT
Start: 2022-12-15 | End: 2022-12-15 | Stop reason: HOSPADM

## 2022-12-15 RX ORDER — HYDROMORPHONE HYDROCHLORIDE 1 MG/ML
0.2 INJECTION, SOLUTION INTRAMUSCULAR; INTRAVENOUS; SUBCUTANEOUS
Status: DISCONTINUED | OUTPATIENT
Start: 2022-12-15 | End: 2022-12-15 | Stop reason: HOSPADM

## 2022-12-15 RX ORDER — NITROFURANTOIN 25; 75 MG/1; MG/1
100 CAPSULE ORAL 2 TIMES DAILY
COMMUNITY
End: 2023-01-24

## 2022-12-15 RX ORDER — SODIUM CHLORIDE, SODIUM LACTATE, POTASSIUM CHLORIDE, CALCIUM CHLORIDE 600; 310; 30; 20 MG/100ML; MG/100ML; MG/100ML; MG/100ML
INJECTION, SOLUTION INTRAVENOUS CONTINUOUS
Status: ACTIVE | OUTPATIENT
Start: 2022-12-15 | End: 2022-12-15

## 2022-12-15 RX ORDER — HYDROMORPHONE HYDROCHLORIDE 1 MG/ML
0.1 INJECTION, SOLUTION INTRAMUSCULAR; INTRAVENOUS; SUBCUTANEOUS
Status: DISCONTINUED | OUTPATIENT
Start: 2022-12-15 | End: 2022-12-15 | Stop reason: HOSPADM

## 2022-12-15 RX ORDER — ONDANSETRON 2 MG/ML
INJECTION INTRAMUSCULAR; INTRAVENOUS PRN
Status: DISCONTINUED | OUTPATIENT
Start: 2022-12-15 | End: 2022-12-15 | Stop reason: SURG

## 2022-12-15 RX ORDER — IBUPROFEN 400 MG/1
800 TABLET ORAL 3 TIMES DAILY PRN
Status: DISCONTINUED | OUTPATIENT
Start: 2022-12-20 | End: 2022-12-17 | Stop reason: HOSPADM

## 2022-12-15 RX ORDER — OXYCODONE HCL 5 MG/5 ML
10 SOLUTION, ORAL ORAL
Status: COMPLETED | OUTPATIENT
Start: 2022-12-15 | End: 2022-12-15

## 2022-12-15 RX ORDER — IBUPROFEN 400 MG/1
800 TABLET ORAL 3 TIMES DAILY
Status: DISCONTINUED | OUTPATIENT
Start: 2022-12-15 | End: 2022-12-17 | Stop reason: HOSPADM

## 2022-12-15 RX ORDER — SODIUM CHLORIDE 9 MG/ML
INJECTION, SOLUTION INTRAVENOUS CONTINUOUS
Status: ACTIVE | OUTPATIENT
Start: 2022-12-15 | End: 2022-12-15

## 2022-12-15 RX ADMIN — CEFAZOLIN 1 G: 1 INJECTION, POWDER, FOR SOLUTION INTRAMUSCULAR; INTRAVENOUS at 21:10

## 2022-12-15 RX ADMIN — ONDANSETRON 4 MG: 2 INJECTION INTRAMUSCULAR; INTRAVENOUS at 14:22

## 2022-12-15 RX ADMIN — IBUPROFEN 800 MG: 400 TABLET, FILM COATED ORAL at 19:41

## 2022-12-15 RX ADMIN — PHENAZOPYRIDINE HYDROCHLORIDE 200 MG: 200 TABLET ORAL at 15:48

## 2022-12-15 RX ADMIN — SODIUM CHLORIDE: 9 INJECTION, SOLUTION INTRAVENOUS at 20:22

## 2022-12-15 RX ADMIN — ACETAMINOPHEN 1000 MG: 500 TABLET ORAL at 19:41

## 2022-12-15 RX ADMIN — CEFAZOLIN 2 G: 330 INJECTION, POWDER, FOR SOLUTION INTRAMUSCULAR; INTRAVENOUS at 13:48

## 2022-12-15 RX ADMIN — PROPOFOL 16 MG: 10 INJECTION, EMULSION INTRAVENOUS at 13:46

## 2022-12-15 RX ADMIN — FENTANYL CITRATE 50 MCG: 50 INJECTION, SOLUTION INTRAMUSCULAR; INTRAVENOUS at 13:45

## 2022-12-15 RX ADMIN — OXYCODONE HYDROCHLORIDE 10 MG: 5 SOLUTION ORAL at 15:22

## 2022-12-15 RX ADMIN — FENTANYL CITRATE 50 MCG: 50 INJECTION, SOLUTION INTRAMUSCULAR; INTRAVENOUS at 13:57

## 2022-12-15 RX ADMIN — DEXAMETHASONE SODIUM PHOSPHATE 4 MG: 4 INJECTION, SOLUTION INTRA-ARTICULAR; INTRALESIONAL; INTRAMUSCULAR; INTRAVENOUS; SOFT TISSUE at 14:22

## 2022-12-15 RX ADMIN — FENTANYL CITRATE 50 MCG: 50 INJECTION, SOLUTION INTRAMUSCULAR; INTRAVENOUS at 15:31

## 2022-12-15 RX ADMIN — PROPOFOL 40 MG: 10 INJECTION, EMULSION INTRAVENOUS at 13:57

## 2022-12-15 RX ADMIN — OXYCODONE 2.5 MG: 5 TABLET ORAL at 22:10

## 2022-12-15 RX ADMIN — KETOROLAC TROMETHAMINE 30 MG: 30 INJECTION, SOLUTION INTRAMUSCULAR at 14:22

## 2022-12-15 RX ADMIN — FENTANYL CITRATE 50 MCG: 50 INJECTION, SOLUTION INTRAMUSCULAR; INTRAVENOUS at 15:22

## 2022-12-15 RX ADMIN — ACETAMINOPHEN 1000 MG: 500 TABLET ORAL at 23:40

## 2022-12-15 RX ADMIN — HYDROMORPHONE HYDROCHLORIDE 0.25 MG: 1 INJECTION, SOLUTION INTRAMUSCULAR; INTRAVENOUS; SUBCUTANEOUS at 23:40

## 2022-12-15 RX ADMIN — FENTANYL CITRATE 50 MCG: 50 INJECTION, SOLUTION INTRAMUSCULAR; INTRAVENOUS at 17:53

## 2022-12-15 RX ADMIN — SODIUM CHLORIDE, POTASSIUM CHLORIDE, SODIUM LACTATE AND CALCIUM CHLORIDE: 600; 310; 30; 20 INJECTION, SOLUTION INTRAVENOUS at 10:25

## 2022-12-15 RX ADMIN — Medication 100 MCG: at 14:21

## 2022-12-15 RX ADMIN — LIDOCAINE HYDROCHLORIDE 80 MG: 20 INJECTION, SOLUTION EPIDURAL; INFILTRATION; INTRACAUDAL at 13:46

## 2022-12-15 RX ADMIN — FENTANYL CITRATE 50 MCG: 50 INJECTION, SOLUTION INTRAMUSCULAR; INTRAVENOUS at 15:48

## 2022-12-15 ASSESSMENT — FIBROSIS 4 INDEX: FIB4 SCORE: 1.01

## 2022-12-15 ASSESSMENT — LIFESTYLE VARIABLES
EVER HAD A DRINK FIRST THING IN THE MORNING TO STEADY YOUR NERVES TO GET RID OF A HANGOVER: NO
ALCOHOL_USE: YES
ON A TYPICAL DAY WHEN YOU DRINK ALCOHOL HOW MANY DRINKS DO YOU HAVE: 2
TOTAL SCORE: 0
EVER FELT BAD OR GUILTY ABOUT YOUR DRINKING: NO
HAVE YOU EVER FELT YOU SHOULD CUT DOWN ON YOUR DRINKING: NO
TOTAL SCORE: 0
HOW MANY TIMES IN THE PAST YEAR HAVE YOU HAD 5 OR MORE DRINKS IN A DAY: 0
CONSUMPTION TOTAL: NEGATIVE
HAVE PEOPLE ANNOYED YOU BY CRITICIZING YOUR DRINKING: NO
TOTAL SCORE: 0
AVERAGE NUMBER OF DAYS PER WEEK YOU HAVE A DRINK CONTAINING ALCOHOL: 1

## 2022-12-15 ASSESSMENT — PAIN DESCRIPTION - PAIN TYPE
TYPE: SURGICAL PAIN
TYPE: SURGICAL PAIN
TYPE: ACUTE PAIN
TYPE: SURGICAL PAIN

## 2022-12-15 ASSESSMENT — COGNITIVE AND FUNCTIONAL STATUS - GENERAL
SUGGESTED CMS G CODE MODIFIER MOBILITY: CH
SUGGESTED CMS G CODE MODIFIER DAILY ACTIVITY: CI
MOBILITY SCORE: 24
DAILY ACTIVITIY SCORE: 23
TOILETING: A LITTLE

## 2022-12-15 ASSESSMENT — PAIN SCALES - GENERAL: PAIN_LEVEL: 6

## 2022-12-15 ASSESSMENT — PATIENT HEALTH QUESTIONNAIRE - PHQ9
SUM OF ALL RESPONSES TO PHQ9 QUESTIONS 1 AND 2: 0
2. FEELING DOWN, DEPRESSED, IRRITABLE, OR HOPELESS: NOT AT ALL
1. LITTLE INTEREST OR PLEASURE IN DOING THINGS: NOT AT ALL

## 2022-12-15 NOTE — OR NURSING
1435: To PACU from OR via gurney,  respirations spontaneous and non-labored No pain or nausea.   1440: CBI catheter is clotted between the tube going to the collection device and the branch coming from the irrigation bags. Able to dislodge clots and get the drip chamber so the drip is visible, the chamber was flooded with fluid so no drip was visible. Pt complaining of the urge to urinate. Explained to pt that was caused by the catheter.   1450: Pt states that there is a burning feeling, but no pain. Again it was explained that the catheter would cause that.  1500: Report given to CAROL Chamberlain and she assumed care. CBI still flowing well, no clots in any lines.

## 2022-12-15 NOTE — ANESTHESIA PREPROCEDURE EVALUATION
Case: [id number] Anesthesia Start Date/Time: 12/15/22 1341    Procedure: BIPOLAR TRANSURETHRAL RESECTION OF PROSTATE (Penis)    Pre-op diagnosis: LOWER URINARY TRACT SYMPTOMS    Location: [place] [address] / SURGERY [facility]    Surgeons: Manjit Martin M.D.          Relevant Problems   GI   (positive) Gastroesophageal reflux disease      Other   (positive) Benign prostatic hyperplasia without lower urinary tract symptoms   (positive) IGT (impaired glucose tolerance)   (positive) Obesity (BMI 30.0-34.9)       Physical Exam    Airway   Mallampati: II  TM distance: >3 FB  Neck ROM: full       Cardiovascular - normal exam  Rhythm: regular  Rate: normal  (-) murmur     Dental - normal exam           Pulmonary - normal exam  Breath sounds clear to auscultation     Abdominal    Neurological - normal exam                 Anesthesia Plan    ASA 2       Plan - general       Airway plan will be LMA          Induction: intravenous    Postoperative Plan: Postoperative administration of opioids is intended.    Pertinent diagnostic labs and testing reviewed    Informed Consent:    Anesthetic plan and risks discussed with patient.    Use of blood products discussed with: patient whom consented to blood products.

## 2022-12-15 NOTE — OP REPORT
Pre-operative Diagnosis: 1. Benign Prostatic Hyperplasia with Lower Urinary Tract Symptoms (LUTS)   Post-operative Diagnosis: Same as above   Procedure: Transurethral resection of the prostate   Surgeon Manjit Martin M.D.    Assistant: None   Anesthesia: General, Anesthesiologist: Master Angel M.D.    Estimated Blood Loss: minmal       Drains: 20f 3way crump catheter   Specimens: prostate   Complications: None   Condition: Stable, procedure well tolerated    Disposition:  1. PACU, observation overnigth on CBI,   2. Void trial POD 1   Findings: 1. Normal urethra without stricture, Bladder with mild trabeculations, ureteral orifice's clearly visualized before and after case, prostate with obstructing lateral lobes and medium median lobe.     Indication:  Hugo Zaman is a 62 y.o. male with benign prostatic hyperplasia with bladder outlet obstruction refractory to conservative management. The risks, benefits, treatment options, potential complications and expected outcomes were discussed with the patient. The patient concurred with the proposed plan, giving informed consent. Risks discussed, but not limited to, were infection, sepsis, bleeding, injury to bladder, need for secondary procedure, persistent urinary retention, rare occurrence of injury to rectum, Ed, retrograde ejaculation, urinary incontinence, chronic dysuria, and the cardiovascular and pulmonary complications of anesthesia/surgery including DVT, PE, and death.    Procedure Details:  The patient was taken to the Operating Room, SCD’s were placed, and received Ancef for glory-operative anti-biotics which was based on pre-operative cultures.  After induction of general anesthesia the patient was intubated and then placed in the lithotomy position using sesar stirrups. The perineum and genitals were then prepped and draped in the standard sterile fashion.  A timeout was done confirming correct patient and procedure.     A 23-Vincentian rigid  cystoscope was passed per urethra sterilely.  The urethra was normal up to the verumontanum at which point a 4 cm prostate with obstructive appearance as described above was noted.  The bladder itself had mild trabeculation. Bilateral ureteral orifices were visualized showing normal efflux and orientation and the bladder had no mucosal abnormalities.       The prostate was then resected bipolar resectoscope. Attention was turned toward the lateral lobes and first the left lobe from 1 o'clock to 6 o'clock and then the right lobe from 11 to'clock to 6 o'clock. Care was taken not to perforate the capsule, not to injure the ureters, and not to resect past the verumontanum and damage the sphincter. All fragments were evacuated via the sheath. A final check revealed intact ureteral orifices bilaterally, the Verumontanum and sphincter appeared intact, and there was a wide open channel was seen from the verumontanum into the bladder at the termination of the case.An 20 F catheter was introduced per urethra sterilely and its balloon was inflated with 30 mL and placed on light traction at which point CBI was initiated.     Procedure was concluded and he was transferred to the PACU in stable condition, will admit overnight for continuous bladder irrigation plan for void trial in the morning if urine is clear.

## 2022-12-15 NOTE — OR NURSING
1505: Report from Natalie RN. Patient A/OX3, reports burning discomfort in urethra - meds per mar. CBI infusing - crump patent to gravity draining dark pink tinged urine, no clots. Slight bleeding noted at crump insertion/urethra site.     1515: Patient c/o burning pain and lower abd cramping - meds per mar for pain. CBI infusing, crump bag emptied and cont to drain dark pink tinged urine with no clots. Patient A/OX3 on RA.     1530: Cont VSS on RA, crump draining CBI patent to gravity - no changes. Meds per mar for pain - reports pain improving but agreeable to more relief. Tolerating sips of clears with no nausea.     1545: Called into OR to request pyridium for patient c/o burning on urination - Dr Martin telephone ordered 200mg Pyridium PO - see mar. Otherwise CBI continues, stable, one bag infusing wide open, crump draining dark pink tinged urine.     1600: Cont stable, pain controlled. CBI infusing, crump draining same dark pink tinged urine. No nausea. Pain controlled. Tolerating sips of clears.     1603: Med hold complete. Meets criteria to transfer to floor room.     1630: Updated Dr Martin re: CBI output is getting darker red, both CBI bags wide open, crump draining patent to gravity - Dr Martin to round after current case complete.     1700: Crump cont drain to gravity. CBI infusing wide open. Drainage continues dark red without improvement or lightening - clots noted at bottom of crump bag but not in drainage line. Patient A/Ox3, reports comfortable at this time except when crump line is repositioned/moved.     1730: Dr Martin at bedside to hand irrigate bladder and inspect CBI infusion.     1754: Meds per mar for patient reports pain increased after hand irrigation and catheter tubing manipulation then removal, and then catheter reinsertion by Dr Martin.     1800: Patient now ready to transfer to floor room per Dr Mario miranda. Updated Will RN on patient status.

## 2022-12-15 NOTE — ANESTHESIA PROCEDURE NOTES
Airway    Date/Time: 12/15/2022 1:47 PM  Performed by: Master Angel M.D.  Authorized by: Master Angel M.D.     Location:  OR  Urgency:  Elective  Indications for Airway Management:  Anesthesia      Spontaneous Ventilation: absent    Sedation Level:  Deep  Preoxygenated: Yes    Mask Difficulty Assessment:  0 - not attempted  Final Airway Type:  Supraglottic airway  Final Supraglottic Airway:  Standard LMA    SGA Size:  4  Number of Attempts at Approach:  1

## 2022-12-16 LAB
ANION GAP SERPL CALC-SCNC: 10 MMOL/L (ref 7–16)
BUN SERPL-MCNC: 19 MG/DL (ref 8–22)
CALCIUM SERPL-MCNC: 8.7 MG/DL (ref 8.4–10.2)
CHLORIDE SERPL-SCNC: 102 MMOL/L (ref 96–112)
CO2 SERPL-SCNC: 24 MMOL/L (ref 20–33)
CREAT SERPL-MCNC: 1.15 MG/DL (ref 0.5–1.4)
ERYTHROCYTE [DISTWIDTH] IN BLOOD BY AUTOMATED COUNT: 47 FL (ref 35.9–50)
GFR SERPLBLD CREATININE-BSD FMLA CKD-EPI: 71 ML/MIN/1.73 M 2
GLUCOSE SERPL-MCNC: 126 MG/DL (ref 65–99)
HCT VFR BLD AUTO: 38.3 % (ref 42–52)
HGB BLD-MCNC: 12.5 G/DL (ref 14–18)
MCH RBC QN AUTO: 29.7 PG (ref 27–33)
MCHC RBC AUTO-ENTMCNC: 32.6 G/DL (ref 33.7–35.3)
MCV RBC AUTO: 91 FL (ref 81.4–97.8)
PLATELET # BLD AUTO: 208 K/UL (ref 164–446)
PMV BLD AUTO: 11.4 FL (ref 9–12.9)
POTASSIUM SERPL-SCNC: 4 MMOL/L (ref 3.6–5.5)
RBC # BLD AUTO: 4.21 M/UL (ref 4.7–6.1)
SODIUM SERPL-SCNC: 136 MMOL/L (ref 135–145)
WBC # BLD AUTO: 16.1 K/UL (ref 4.8–10.8)

## 2022-12-16 PROCEDURE — A9270 NON-COVERED ITEM OR SERVICE: HCPCS | Performed by: UROLOGY

## 2022-12-16 PROCEDURE — 700111 HCHG RX REV CODE 636 W/ 250 OVERRIDE (IP): Performed by: UROLOGY

## 2022-12-16 PROCEDURE — 700102 HCHG RX REV CODE 250 W/ 637 OVERRIDE(OP): Performed by: UROLOGY

## 2022-12-16 PROCEDURE — 36415 COLL VENOUS BLD VENIPUNCTURE: CPT

## 2022-12-16 PROCEDURE — 700105 HCHG RX REV CODE 258: Performed by: UROLOGY

## 2022-12-16 PROCEDURE — 85027 COMPLETE CBC AUTOMATED: CPT

## 2022-12-16 PROCEDURE — 80048 BASIC METABOLIC PNL TOTAL CA: CPT

## 2022-12-16 PROCEDURE — A9270 NON-COVERED ITEM OR SERVICE: HCPCS | Performed by: PHYSICIAN ASSISTANT

## 2022-12-16 PROCEDURE — G0378 HOSPITAL OBSERVATION PER HR: HCPCS

## 2022-12-16 PROCEDURE — 96366 THER/PROPH/DIAG IV INF ADDON: CPT

## 2022-12-16 PROCEDURE — 94760 N-INVAS EAR/PLS OXIMETRY 1: CPT

## 2022-12-16 PROCEDURE — 700102 HCHG RX REV CODE 250 W/ 637 OVERRIDE(OP): Performed by: PHYSICIAN ASSISTANT

## 2022-12-16 RX ORDER — OMEPRAZOLE 20 MG/1
20 CAPSULE, DELAYED RELEASE ORAL DAILY
Status: DISCONTINUED | OUTPATIENT
Start: 2022-12-16 | End: 2022-12-17 | Stop reason: HOSPADM

## 2022-12-16 RX ADMIN — OMEPRAZOLE 20 MG: 20 CAPSULE, DELAYED RELEASE ORAL at 12:40

## 2022-12-16 RX ADMIN — ACETAMINOPHEN 1000 MG: 500 TABLET ORAL at 12:40

## 2022-12-16 RX ADMIN — IBUPROFEN 800 MG: 400 TABLET, FILM COATED ORAL at 18:12

## 2022-12-16 RX ADMIN — OXYCODONE 5 MG: 5 TABLET ORAL at 16:54

## 2022-12-16 RX ADMIN — ACETAMINOPHEN 1000 MG: 500 TABLET ORAL at 18:10

## 2022-12-16 RX ADMIN — IBUPROFEN 800 MG: 400 TABLET, FILM COATED ORAL at 12:40

## 2022-12-16 RX ADMIN — CEFAZOLIN 1 G: 1 INJECTION, POWDER, FOR SOLUTION INTRAMUSCULAR; INTRAVENOUS at 06:08

## 2022-12-16 RX ADMIN — OXYCODONE 5 MG: 5 TABLET ORAL at 20:38

## 2022-12-16 RX ADMIN — OXYCODONE 5 MG: 5 TABLET ORAL at 06:12

## 2022-12-16 RX ADMIN — OXYCODONE 5 MG: 5 TABLET ORAL at 11:15

## 2022-12-16 RX ADMIN — CEFAZOLIN 1 G: 1 INJECTION, POWDER, FOR SOLUTION INTRAMUSCULAR; INTRAVENOUS at 20:31

## 2022-12-16 RX ADMIN — IBUPROFEN 800 MG: 400 TABLET, FILM COATED ORAL at 05:56

## 2022-12-16 RX ADMIN — ACETAMINOPHEN 1000 MG: 500 TABLET ORAL at 05:56

## 2022-12-16 RX ADMIN — CEFAZOLIN 1 G: 1 INJECTION, POWDER, FOR SOLUTION INTRAMUSCULAR; INTRAVENOUS at 13:53

## 2022-12-16 ASSESSMENT — PATIENT HEALTH QUESTIONNAIRE - PHQ9
SUM OF ALL RESPONSES TO PHQ9 QUESTIONS 1 AND 2: 0
1. LITTLE INTEREST OR PLEASURE IN DOING THINGS: NOT AT ALL
2. FEELING DOWN, DEPRESSED, IRRITABLE, OR HOPELESS: NOT AT ALL

## 2022-12-16 ASSESSMENT — PAIN DESCRIPTION - PAIN TYPE
TYPE: ACUTE PAIN

## 2022-12-16 NOTE — ANESTHESIA POSTPROCEDURE EVALUATION
Patient: Hugo Zaman    Procedure Summary     Date: 12/15/22 Room / Location:  OR  / SURGERY [facility]    Anesthesia Start: 1341 Anesthesia Stop: 1439    Procedure: BIPOLAR TRANSURETHRAL RESECTION OF PROSTATE (Penis) Diagnosis: (LOWER URINARY TRACT SYMPTOMS)    Surgeons: Manjit Martin M.D. Responsible Provider: Master Angel M.D.    Anesthesia Type: general ASA Status: 2          Final Anesthesia Type: general  Last vitals  BP   Blood Pressure: 122/88    Temp   36.3 °C (97.3 °F)    Pulse   72   Resp   15    SpO2   93 %      Anesthesia Post Evaluation    Patient location during evaluation: PACU  Patient participation: complete - patient participated  Level of consciousness: sleepy but conscious  Pain score: 6    Airway patency: patent  Anesthetic complications: no  Cardiovascular status: hemodynamically stable  Respiratory status: acceptable  Hydration status: euvolemic    PONV: none          There were no known notable events for this encounter.     Nurse Pain Score: 8 (NPRS)

## 2022-12-16 NOTE — CARE PLAN
The patient is Stable - Low risk of patient condition declining or worsening    Shift Goals  Clinical Goals: Pt CBI will run the whole night with no clot formation.    Progress made toward(s) clinical / shift goals:  Pt CBI had small clots noted in urine. CBI was able to be titrated to a slower drip rate with urine output remaining a light pink color.    Patient is not progressing towards the following goals: n/a

## 2022-12-16 NOTE — DISCHARGE SUMMARY
[facility] [place]   Surgery Discharge Summary                                                                       Attending Physician:  Manjit Martin M.D.    Admission Date: 12/15/2022   Discharge Date: 12/17/2022    Admission Diagnosis: Bladder outlet obstruction, BPH   Discharge Diagnosis: Bladder Outlet Obstruction, BPH   Complications: none   Disposition: Home   Condition of patient: good   Discharge Diagnoses Principal Problem:    Bladder outlet obstruction POA: Yes  Resolved Problems:    * No resolved hospital problems. *     Comorbidities:  has a past medical history of Anesthesia and Renal disorder (2013).     Patient ID:   Hugo Zaman is a 62 y.o. male with history of lower urinary tract symptoms refractory to medications.  He had evidence of bladder outlet obstruction and he was offered intervention.  After a full discussion of alternatives, risks, and benefits the patient consented to proceeding with trans urethral resection of the prostate. He was admitted on 12/15/2022 for observation overnight post-operatively.  Please see operative note for details.    Reason for Hospitalization: Surgical procedure as per above  Hospital Course/Significant Findings: The patient was admitted the day of the procedure which was well tolerated and without complications, and he was transferred to the floor in stable condition. His post-operative course was unremarkable.  He had continuous bladder irrigation running overnight, and by the morning it was light pink.  His bladder was filled and he voided.  Subsequent voids showed urine adequately light in color and PVR  48cc of urine.  He was thus discharged without a catheter.  He began tolerating a regular diet immediately.  He achieved adequate pain control promptly with prn po analgesics.    By discharge on POD2 the patient was ambulating, tolerating a regular diet, his pain was well controlled with oral medications, he was hemodynamically stable, was passing  flatus and gas, he was voiding easily per urethra, and had been afebrile for more than 24 hours.  Physical exam was notable for no fever, stable vital signs, light urine with no significant clots,  and was otherwise unremarkable.    CONSULTATIONS  None     Labs:  Lab Results   Component Value Date/Time    WBC 16.1 (H) 12/16/2022 02:40 AM    HEMATOCRIT 38.3 (L) 12/16/2022 02:40 AM    SODIUM 136 12/16/2022 02:40 AM    POTASSIUM 4.0 12/16/2022 02:40 AM    CHLORIDE 102 12/16/2022 02:40 AM    CO2 24 12/16/2022 02:40 AM    BUN 19 12/16/2022 02:40 AM    CREATININE 1.15 12/16/2022 02:40 AM    CALCIUM 8.7 12/16/2022 02:40 AM         Discharge Medications:     Medication List        CONTINUE taking these medications        Instructions   finasteride 5 MG Tabs  Commonly known as: PROSCAR   Take 5 mg by mouth every day.  Dose: 5 mg     nitrofurantoin 100 MG Caps  Commonly known as: MACROBID   Take 100 mg by mouth 2 times a day.  Dose: 100 mg            ASK your doctor about these medications        Instructions   MULTIVITAMIN ADULT PO   Take  by mouth every day.               Specific outpatient follow up  The patient was discharged without a catheter and we will therefore have him follow up in clinic in 1month.    ACTIVITY  Light duty and as tolerated.  Weight bearing as tolerated     Appointments Scheduled in the Next 30 Days:  No follow-up provider specified.   Future Appointments   Date Time Provider Department Center   1/6/2023  7:00 AM Tash Cao M.D. [address]       Total time of the discharge process exceeds 32 minutes    Nikia Mccain PA-C  [facility] [place]

## 2022-12-16 NOTE — PROGRESS NOTES
"Pt complaining of pressure \"feels like I need to pee but I can't.\" Manually irrigated with some medium clots returned. Pt felt relief after irrigation.  "

## 2022-12-16 NOTE — PROGRESS NOTES
Patient complained of pressure. Manual irrigation done with 120ml with some medium clots came out from the tube and syringe. Patient verbalized relief after irrigation.

## 2022-12-16 NOTE — PROGRESS NOTES
Assumed care of pt at 1915. Received report from Rajesh MEDINA. Pt resting in bed with 3 way catheter in place. CBI titrated to have light pink urine output. Discussed plan for the night including monitoring CBI and managing pain overnight. No other needs at this time, call light in reach, bed in lowest position.

## 2022-12-16 NOTE — CARE PLAN
The patient is Stable - Low risk of patient condition declining or worsening    Shift Goals  Clinical Goals: CBI will have no clot formation and have clear output. Patient able to ambulate within the shift  Patient Goals: comfort and pain contorl    Progress made toward(s) clinical / shift goals:  CBI irrigated manually with some clots that came out from the crump. Patient able to ambulate safely and remained free form falls throughout the shift.   Problem: Pain - Standard  Goal: Alleviation of pain or a reduction in pain to the patient’s comfort goal  Outcome: Progressing       Patient is not progressing towards the following goals:

## 2022-12-16 NOTE — PROGRESS NOTES
Received report from NOC RN. Pt is A&O 4. VSS. Pt has complaints of tolerable pain with a scale of 5/10. Denies N/V as of assessment. Educated on pain medication use. Three way crump for CBI in place and flushing well with light red output without clots noted. Safety precaution in place. All needs met at this time.

## 2022-12-16 NOTE — DISCHARGE PLANNING
Case Management Discharge Planning    Admission Date: 12/15/2022  GMLOS:    ALOS: 0    6-Clicks ADL Score: 23  6-Clicks Mobility Score: 24      Anticipated Discharge Dispo: Discharge Disposition: Discharged to home/self care (01)    DME Needed: No    Action(s) Taken: Updated Provider/Nurse on Discharge Plan    No anticipated DC needs. RN CM will continue to follow.     Escalations Completed: None    Medically Clear: No    Next Steps: Medical clearance    Barriers to Discharge: Medical clearance

## 2022-12-16 NOTE — PROGRESS NOTES
4 Eyes Skin Assessment Completed by Holden RN and Francisco J RN.    Head WDL  Ears WDL  Nose WDL  Mouth WDL  Neck WDL  Breast/Chest WDL  Shoulder Blades WDL  Spine WDL  (R) Arm/Elbow/Hand WDL  (L) Arm/Elbow/Hand WDL  Abdomen WDL  Groin WDL  Scrotum/Coccyx/Buttocks bleeding at tip of penis  (R) Leg WDL  (L) Leg WDL  (R) Heel/Foot/Toe WDL  (L) Heel/Foot/Toe WDL          Devices In Places Pulse Ox and Sultana      Interventions In Place Pressure Redistribution Mattress    Possible Skin Injury No    Pictures Uploaded Into Epic N/A  Wound Consult Placed N/A  RN Wound Prevention Protocol Ordered No

## 2022-12-17 ENCOUNTER — HOSPITAL ENCOUNTER (EMERGENCY)
Facility: MEDICAL CENTER | Age: 62
End: 2022-12-17
Attending: EMERGENCY MEDICINE
Payer: COMMERCIAL

## 2022-12-17 VITALS
BODY MASS INDEX: 32.77 KG/M2 | WEIGHT: 203.93 LBS | RESPIRATION RATE: 16 BRPM | DIASTOLIC BLOOD PRESSURE: 81 MMHG | TEMPERATURE: 97.7 F | SYSTOLIC BLOOD PRESSURE: 115 MMHG | HEIGHT: 66 IN | OXYGEN SATURATION: 96 % | HEART RATE: 92 BPM

## 2022-12-17 VITALS
DIASTOLIC BLOOD PRESSURE: 90 MMHG | SYSTOLIC BLOOD PRESSURE: 138 MMHG | BODY MASS INDEX: 34.69 KG/M2 | WEIGHT: 215.83 LBS | RESPIRATION RATE: 18 BRPM | HEIGHT: 66 IN | OXYGEN SATURATION: 98 % | TEMPERATURE: 98.2 F | HEART RATE: 82 BPM

## 2022-12-17 DIAGNOSIS — T83.9XXA PROBLEM WITH FOLEY CATHETER, INITIAL ENCOUNTER (HCC): ICD-10-CM

## 2022-12-17 PROCEDURE — 303105 HCHG CATHETER EXTRA

## 2022-12-17 PROCEDURE — 700102 HCHG RX REV CODE 250 W/ 637 OVERRIDE(OP): Performed by: PHYSICIAN ASSISTANT

## 2022-12-17 PROCEDURE — 99284 EMERGENCY DEPT VISIT MOD MDM: CPT

## 2022-12-17 PROCEDURE — 51700 IRRIGATION OF BLADDER: CPT

## 2022-12-17 PROCEDURE — 700102 HCHG RX REV CODE 250 W/ 637 OVERRIDE(OP): Performed by: EMERGENCY MEDICINE

## 2022-12-17 PROCEDURE — 51702 INSERT TEMP BLADDER CATH: CPT

## 2022-12-17 PROCEDURE — 700111 HCHG RX REV CODE 636 W/ 250 OVERRIDE (IP): Performed by: UROLOGY

## 2022-12-17 PROCEDURE — A9270 NON-COVERED ITEM OR SERVICE: HCPCS | Performed by: EMERGENCY MEDICINE

## 2022-12-17 PROCEDURE — 700102 HCHG RX REV CODE 250 W/ 637 OVERRIDE(OP): Performed by: UROLOGY

## 2022-12-17 PROCEDURE — A9270 NON-COVERED ITEM OR SERVICE: HCPCS | Performed by: PHYSICIAN ASSISTANT

## 2022-12-17 PROCEDURE — A9270 NON-COVERED ITEM OR SERVICE: HCPCS | Performed by: UROLOGY

## 2022-12-17 PROCEDURE — 51798 US URINE CAPACITY MEASURE: CPT

## 2022-12-17 PROCEDURE — G0378 HOSPITAL OBSERVATION PER HR: HCPCS

## 2022-12-17 PROCEDURE — 700105 HCHG RX REV CODE 258: Performed by: UROLOGY

## 2022-12-17 RX ORDER — OXYBUTYNIN CHLORIDE 5 MG/1
5 TABLET ORAL ONCE
Status: COMPLETED | OUTPATIENT
Start: 2022-12-17 | End: 2022-12-17

## 2022-12-17 RX ORDER — OXYBUTYNIN CHLORIDE 5 MG/1
5 TABLET ORAL 2 TIMES DAILY
Qty: 7 TABLET | Refills: 0 | Status: SHIPPED | OUTPATIENT
Start: 2022-12-17 | End: 2023-07-21

## 2022-12-17 RX ORDER — PREDNISONE 10 MG/1
20 TABLET ORAL ONCE
Status: DISCONTINUED | OUTPATIENT
Start: 2022-12-17 | End: 2022-12-17

## 2022-12-17 RX ORDER — COLCHICINE 0.6 MG/1
0.6 TABLET ORAL ONCE
Status: DISCONTINUED | OUTPATIENT
Start: 2022-12-17 | End: 2022-12-17

## 2022-12-17 RX ADMIN — ACETAMINOPHEN 1000 MG: 500 TABLET ORAL at 12:50

## 2022-12-17 RX ADMIN — CEFAZOLIN 1 G: 1 INJECTION, POWDER, FOR SOLUTION INTRAMUSCULAR; INTRAVENOUS at 05:48

## 2022-12-17 RX ADMIN — IBUPROFEN 800 MG: 400 TABLET, FILM COATED ORAL at 05:42

## 2022-12-17 RX ADMIN — OMEPRAZOLE 20 MG: 20 CAPSULE, DELAYED RELEASE ORAL at 05:42

## 2022-12-17 RX ADMIN — OXYCODONE 5 MG: 5 TABLET ORAL at 02:33

## 2022-12-17 RX ADMIN — ACETAMINOPHEN 1000 MG: 500 TABLET ORAL at 05:42

## 2022-12-17 RX ADMIN — OXYCODONE 5 MG: 5 TABLET ORAL at 09:51

## 2022-12-17 RX ADMIN — CEFAZOLIN 1 G: 1 INJECTION, POWDER, FOR SOLUTION INTRAMUSCULAR; INTRAVENOUS at 14:21

## 2022-12-17 RX ADMIN — IBUPROFEN 800 MG: 400 TABLET, FILM COATED ORAL at 12:49

## 2022-12-17 RX ADMIN — OXYCODONE 5 MG: 5 TABLET ORAL at 14:54

## 2022-12-17 RX ADMIN — OXYBUTYNIN CHLORIDE 5 MG: 5 TABLET ORAL at 20:30

## 2022-12-17 RX ADMIN — ACETAMINOPHEN 1000 MG: 500 TABLET ORAL at 00:15

## 2022-12-17 ASSESSMENT — PAIN DESCRIPTION - PAIN TYPE
TYPE: SURGICAL PAIN
TYPE: ACUTE PAIN
TYPE: SURGICAL PAIN
TYPE: SURGICAL PAIN
TYPE: ACUTE PAIN
TYPE: SURGICAL PAIN

## 2022-12-17 ASSESSMENT — FIBROSIS 4 INDEX: FIB4 SCORE: 1.18430824449154003

## 2022-12-17 NOTE — PROGRESS NOTES
Received report from night RN. Seen patient in bed. Alert and oriented x4. Denies SOB. Denies chest pain. 3-way crump in place and draining pink to reddish urine. CBI on going. Denies pain at this time. Discussed plan of care and understood. Call light placed within reach.

## 2022-12-17 NOTE — CARE PLAN
The patient is Stable - Low risk of patient condition declining or worsening    Shift Goals  Clinical Goals: Pt will have CBI remain patent throughout the night.  Patient Goals: comfort and pain control    Progress made toward(s) clinical / shift goals:  Pt CBI patent throughout the night. Pt required hand irrigation twice overnight.     Patient is not progressing towards the following goals: n/a

## 2022-12-17 NOTE — PROGRESS NOTES
"Urology Progress Note    Post op Day # 1    Overnight Events: RN had to hand irrigate crump several times to remove clots from bladder    S: No fevers, chills, nausea or vomiting.  +flatus. No other complaints. Wife at bedside.     O:   /70   Pulse 90   Temp 36.4 °C (97.6 °F) (Oral)   Resp 18   Ht 1.676 m (5' 6\")   Wt 92.5 kg (203 lb 14.8 oz)   SpO2 93%   Recent Labs     12/15/22  1531 12/16/22  0240   SODIUM 139 136   POTASSIUM 4.1 4.0   CHLORIDE 105 102   CO2 25 24   GLUCOSE 92 126*   BUN 13 19   CREATININE 1.00 1.15   CALCIUM 8.6 8.7     Recent Labs     12/15/22  1531 12/16/22  0240   WBC 6.7 16.1*   RBC 4.63* 4.21*   HEMOGLOBIN 14.0 12.5*   HEMATOCRIT 41.9* 38.3*   MCV 90.5 91.0   MCH 30.2 29.7   MCHC 33.4* 32.6*   RDW 46.1 47.0   PLATELETCT 193 208   MPV 11.3 11.4         Intake/Output Summary (Last 24 hours) at 12/17/2022 0953  Last data filed at 12/17/2022 0725  Gross per 24 hour   Intake 1098.57 ml   Output -3020 ml   Net 4118.57 ml       Exam:  Abdomen soft, benign.    Urine: cherry red with small clots removed with hand irrigation at bedside      A/P:    Active Hospital Problems    Diagnosis     Bladder outlet obstruction [N32.0]        PLAN:  - Ambulate, IS.  - Hand irrigate qshift and prn clots  - continue CBI, titrate to light pink or clear  - Patient will stay another night for CBI with anticipation for TOV and discharge in am.    "

## 2022-12-17 NOTE — PROGRESS NOTES
Bladder scan result 388. Updated urology, AR Mccain. Ordered to insert Coude Cath Fr 18 attached to leg bag. Okay to discharge post insertion.

## 2022-12-17 NOTE — PROGRESS NOTES
Coude Cath Fr 18 inserted without issues, procedure tolerated well by patient. Relief of pressure and suprapubic pain noted. Able to drain 450 ml of pink to red colored urine with some small to medium sized clots. MD aware.

## 2022-12-17 NOTE — PROGRESS NOTES
Assumed care of pt at 1915. Received report from Ann MEDINA. Pt resting in bed pain at a 7/10 that pt described as pressure. Pt medicated per MAR. Irrigated pt CBI manually with a few small clots noted. Pt CBI titrated to have crump output at a light pink color. After manual irrigation pt stated that his pain had improved. Discussed plan for the night including IV abx, monitoring CBI output, and managing pain. No other needs at this time, call light in reach, bed in lowest position.

## 2022-12-18 NOTE — PROGRESS NOTES
Discharge instructions, home medication and follow up appointment discussed with patient and understood. Denies any question at this time. All belongings sent with. Discharged alert and oriented x4 in stable condition. Discharge instructions, home medication and follow up appointment discussed with patient and understood. Denies any question at this time. All belongings sent with. Discharged alert and oriented x4 in stable condition. Discharged with crump catheter in place draining pink to clear urine with very few clots. Instructions on how to trouble shoot including health teaching regarding crump catheter done and understood. Demonstrated how to drain and trouble shoot catheter by patient and wife. Denies any questions at this time.

## 2022-12-18 NOTE — DISCHARGE INSTRUCTIONS
Follow-up with your urologist on Monday as planned.  Return here if you develop new or worsening symptoms or you cannot urinate or the catheter becomes plugged.

## 2022-12-18 NOTE — ED NOTES
CBI completed. PT no longer passing clots. Discharged per MD order. PT verbalizes understanding of discharge orders and ambulates with steady gate to lobby accompanied by wife.

## 2022-12-18 NOTE — ED PROVIDER NOTES
ED Provider Note    CHIEF COMPLAINT  Chief Complaint   Patient presents with    Urinary Catheter Problem     HPI  Hugo Zaman is a 62 y.o. male who presents to the emergency department complaining that urine is leaking around the outside of his Sultana catheter.  The patient was just discharged from the hospital this afternoon and after being home for about 1 hour noted that he was leaking urine around the outside of his Sultana catheter so he is returned to the emergency department for recheck.  The patient was initially admitted to the hospital and underwent TURP on Thursday and then he describes having a lot of postoperative bleeding and he required several rounds of bladder irrigation and then this morning he was unable to urinate on his own so a Sultana catheter was placed and the patient was discharged home with a catheter in place and has plans to follow-up with his urologist on Monday.  Other than this problem with the leaking he has not developed any new symptoms.    EXTERNAL RECORDS REVIEWED  Select: Inpatient Notes I reviewed the discharge summary from earlier today and I also reviewed the case with Dr. Rapp from urology.    REVIEW OF SYSTEMS  See HPI for further details. All other systems are negative.     PAST MEDICAL HISTORY   has a past medical history of Anesthesia and Renal disorder ().    SOCIAL HISTORY  Social History     Tobacco Use    Smoking status: Former     Types: Cigarettes     Quit date:      Years since quittin.9    Smokeless tobacco: Never   Vaping Use    Vaping Use: Never used   Substance and Sexual Activity    Alcohol use: Yes     Alcohol/week: 0.6 oz     Types: 1 Glasses of wine per week     Comment: 1 per week    Drug use: Never    Sexual activity: Not on file       SURGICAL HISTORY   has a past surgical history that includes hernia repair (); lithotripsy; lumbar puncture (); knee arthroscopy (Left, 2005); sinuscopy (); and transurethral elec-surg  "prostatectom (N/A, 12/15/2022).    CURRENT MEDICATIONS  Home Medications    **Home medications have not yet been reviewed for this encounter**         ALLERGIES  No Known Allergies    PHYSICAL EXAM  VITAL SIGNS: BP (!) 144/93   Pulse (!) 104   Temp 36 °C (96.8 °F) (Temporal)   Resp 17   Ht 1.676 m (5' 6\")   Wt 97.9 kg (215 lb 13.3 oz)   SpO2 97%   BMI 34.84 kg/m²    Constitutional: Awake lucid verbal pleasant individual he does not appear toxic or distressed  Neck: Trachea midline no JVD  Cardiovascular: Regular rate and rhythm at the time of my exam although I did note a heart rate of 104 recorded in triage  Respiratory: Clear bilaterally with no apparent difficulty breathing  Abdomen: Soft and nondistended no rebound guarding or peritoneal findings bowel sounds are present.  There is an 18 [place] Sultana catheter in place there is blood-tinged urine collecting in the Sultana bag.  Musculoskeletal: No acute bony deformity  Neurologic: Awake verbal moving all extremities        DIAGNOSTIC STUDIES / PROCEDURES        COURSE & MEDICAL DECISION MAKING  Pertinent Labs & Imaging studies reviewed. (See chart for details)  In the emergency department the patient's catheter was irrigated by nursing staff and.  The catheter appears to be patent and there do not appear to be blood clots or frankly gross blood but there is blood-tinged return with irrigation.  Also during irrigation of the catheter it is noted that urine is leaking around the outside of the catheter.  I have reviewed the case over the phone with Dr. Rapp as noted above and if necessary he is comfortable with us replacing the Sultana with a larger catheter and recommended 22 or 24 Guinean particularly if there are any clots with irrigation.  Also Dr. Rapp recommends starting the patient on oxybutynin 5 mg twice daily and encouraging the patient to follow-up with urology in the office as planned.    Nursing staff replaced the catheter with a 22 Guinean three-way " Sultana and now that there is a larger catheter in place there does seem to be clots present so were going to irrigate the bladder and clear those prior to discharge.  I have reviewed all this in detail with the patient and his family and recommended that he follow-up with urology as planned on Monday.  If his catheter stops functioning or he has other new or worsening symptoms before his scheduled follow-up he is to return here for recheck    FINAL IMPRESSION  1.  Problem with Sultana catheter  2.  Hematuria after TURP    Electronically signed by: Braden Lowery M.D., 12/17/2022 8:15 PM

## 2023-01-06 ENCOUNTER — APPOINTMENT (OUTPATIENT)
Dept: MEDICAL GROUP | Facility: MEDICAL CENTER | Age: 63
End: 2023-01-06
Payer: COMMERCIAL

## 2023-01-20 ENCOUNTER — TELEPHONE (OUTPATIENT)
Dept: MEDICAL GROUP | Facility: MEDICAL CENTER | Age: 63
End: 2023-01-20
Payer: COMMERCIAL

## 2023-01-20 NOTE — TELEPHONE ENCOUNTER
Patient called stating he has an appt scheduled for Tuesday and would like labwork to be order prior

## 2023-01-24 ENCOUNTER — HOSPITAL ENCOUNTER (OUTPATIENT)
Facility: MEDICAL CENTER | Age: 63
End: 2023-01-24
Attending: FAMILY MEDICINE
Payer: COMMERCIAL

## 2023-01-24 ENCOUNTER — OFFICE VISIT (OUTPATIENT)
Dept: MEDICAL GROUP | Facility: MEDICAL CENTER | Age: 63
End: 2023-01-24
Payer: COMMERCIAL

## 2023-01-24 VITALS
DIASTOLIC BLOOD PRESSURE: 77 MMHG | OXYGEN SATURATION: 98 % | BODY MASS INDEX: 33.77 KG/M2 | RESPIRATION RATE: 16 BRPM | WEIGHT: 210.1 LBS | TEMPERATURE: 96.8 F | SYSTOLIC BLOOD PRESSURE: 122 MMHG | HEIGHT: 66 IN | HEART RATE: 88 BPM

## 2023-01-24 DIAGNOSIS — R73.02 IGT (IMPAIRED GLUCOSE TOLERANCE): ICD-10-CM

## 2023-01-24 DIAGNOSIS — R97.20 ELEVATED PSA: ICD-10-CM

## 2023-01-24 DIAGNOSIS — R39.9 UTI SYMPTOMS: ICD-10-CM

## 2023-01-24 DIAGNOSIS — E66.9 OBESITY (BMI 30.0-34.9): ICD-10-CM

## 2023-01-24 DIAGNOSIS — N40.0 BENIGN PROSTATIC HYPERPLASIA WITHOUT LOWER URINARY TRACT SYMPTOMS: ICD-10-CM

## 2023-01-24 LAB — AMBIGUOUS DTTM AMBI4: NORMAL

## 2023-01-24 PROCEDURE — 81001 URINALYSIS AUTO W/SCOPE: CPT

## 2023-01-24 PROCEDURE — 87186 SC STD MICRODIL/AGAR DIL: CPT

## 2023-01-24 PROCEDURE — 99214 OFFICE O/P EST MOD 30 MIN: CPT | Performed by: FAMILY MEDICINE

## 2023-01-24 PROCEDURE — 87077 CULTURE AEROBIC IDENTIFY: CPT

## 2023-01-24 PROCEDURE — 87086 URINE CULTURE/COLONY COUNT: CPT

## 2023-01-24 RX ORDER — ESOMEPRAZOLE MAGNESIUM 40 MG/1
40 CAPSULE, DELAYED RELEASE ORAL
COMMUNITY
Start: 2022-11-07 | End: 2023-01-24

## 2023-01-24 RX ORDER — NITROFURANTOIN 25; 75 MG/1; MG/1
100 CAPSULE ORAL 2 TIMES DAILY
Qty: 14 CAPSULE | Refills: 0 | Status: SHIPPED | OUTPATIENT
Start: 2023-01-24 | End: 2023-07-21

## 2023-01-24 RX ORDER — OXYCODONE HYDROCHLORIDE 5 MG/1
TABLET ORAL
COMMUNITY
Start: 2022-12-18 | End: 2023-01-24

## 2023-01-24 ASSESSMENT — PATIENT HEALTH QUESTIONNAIRE - PHQ9: CLINICAL INTERPRETATION OF PHQ2 SCORE: 0

## 2023-01-24 ASSESSMENT — FIBROSIS 4 INDEX: FIB4 SCORE: 1.18430824449154003

## 2023-01-24 NOTE — PROGRESS NOTES
CC: UTI symptoms BPH/elevated PSA, impaired glucose tolerance, obesity    HPI:   Hugo presents today to discuss the following:    UTI symptoms  Patient reported pain with urination ongoing for a while but lately gets worse.  Denies any nausea, vomiting, or blood in the urine.  Denies any flank/abdominal pain.  He noticed that his urine not clear, however no blood    Benign prostatic hyperplasia without lower urinary tract symptoms/ Elevated PSA  Patient underwent transurethral resection of prostate, then biopsy of the surgically removed specimen which showed benign prostatic hypertrophy no malignancy.  Patient has been following up with urology.  As per patient he will repeat PSA during his appointment with urology    IGT (impaired glucose tolerance)  Blood glucose has been slightly high above 100, however patient has been asymptomatic.  No history of diabetes.  Not on medication    Obesity (BMI 30.0-34.9)  BMI 33.9.  Patient gained about 7 pounds since September 2022  The medical rationale for weight loss in obese individuals is that obesity is associated with a significant increase in mortality, and many health risks including type 2 diabetes mellitus, hypertension, dyslipidemia, and coronary heart disease. The benefits of weight loss include a reduction in the rate of progression from impaired glucose tolerance to diabetes, blood pressure in hypertensive patients, and lipid levels in higher risk patients. Other noncardiac benefits of weight loss include reductions in urinary incontinence, sleep apnea, and depression, and improvements in quality of life, physical functioning, and mobility.Recommend lifestyle modification: exercise 30 minutes per day 5 days per week. Recommend also portion control.      Patient Active Problem List    Diagnosis Date Noted    Bladder outlet obstruction 12/15/2022    Gastroesophageal reflux disease 04/26/2022    Benign prostatic hyperplasia without lower urinary tract symptoms  "04/26/2022    Intertriginous candidiasis 04/26/2022    Elevated PSA 04/26/2022    IGT (impaired glucose tolerance) 04/26/2022    Obesity (BMI 30.0-34.9) 04/26/2022       Current Outpatient Medications   Medication Sig Dispense Refill    oxybutynin (DITROPAN) 5 MG Tab Take 1 Tablet by mouth 2 times a day. 7 Tablet 0     No current facility-administered medications for this visit.         Allergies as of 01/24/2023    (No Known Allergies)        ROS: Denies any chest pain, Shortness of breath, Changes bowel or bladder, Lower extremity edema.    Physical Exam:  /77 (BP Location: Right arm, Patient Position: Sitting, BP Cuff Size: Adult)   Pulse 88   Temp 36 °C (96.8 °F) (Temporal)   Resp 16   Ht 1.676 m (5' 6\")   Wt 95.3 kg (210 lb 1.6 oz)   SpO2 98%   BMI 33.91 kg/m²   Gen.: Obese, no apparent distress,pleasant and cooperative with the examination  Skin:  Warm and dry with good turgor. No rashes or suspicious lesions in visible areas  HEENT:Sinuses nontender with palpation, TMs clear, nares patent with pink mucosa and clear rhinorrhea,no septal deviation ,polyps or lesions. lips without lesions, oropharynx clear.  Neck: Trachea midline,no masses or adenopathy. No JVD.  Cor: Regular rate and rhythm without murmur, gallop or rub.  Lungs: Respirations unlabored.Clear to auscultation with equal breath sounds bilaterally. No wheezes, rhonchi.  Extremities: No cyanosis, clubbing or edema.          Assessment and Plan.   62 y.o. male     1. UTI symptoms  Urinalysis is positive for UTI.  I will start patient on Macrobid 100 mg twice a day for a week  We will send urine for culture  I Recommend hydration.      - URINALYSIS,CULTURE IF INDICATED; Future    2. Benign prostatic hyperplasia without lower urinary tract symptoms  3. Elevated PSA  Status post TURP.  Biopsy showed benign prostatic hypertrophy no malignancy.  Continue follow-up with urology.  As per patient he will repeat PSA during his appointment with " urology    4. IGT (impaired glucose tolerance)  Blood glucose has been slightly high above 100, however patient has been asymptomatic  Patient is counseled on lifestyle modification    5. Obesity (BMI 30.0-34.9)  BMI 33.9.  Patient gained about 7 pounds since September 2022  Patient is counseled on lifestyle medication.

## 2023-01-25 LAB
APPEARANCE UR: ABNORMAL
BACTERIA #/AREA URNS HPF: ABNORMAL /HPF
BILIRUB UR QL STRIP.AUTO: NEGATIVE
COLOR UR: YELLOW
EPI CELLS #/AREA URNS HPF: NEGATIVE /HPF
GLUCOSE UR STRIP.AUTO-MCNC: NEGATIVE MG/DL
HYALINE CASTS #/AREA URNS LPF: ABNORMAL /LPF
KETONES UR STRIP.AUTO-MCNC: NEGATIVE MG/DL
LEUKOCYTE ESTERASE UR QL STRIP.AUTO: ABNORMAL
MICRO URNS: ABNORMAL
NITRITE UR QL STRIP.AUTO: NEGATIVE
PH UR STRIP.AUTO: 7 [PH] (ref 5–8)
PROT UR QL STRIP: 100 MG/DL
RBC # URNS HPF: ABNORMAL /HPF
RBC UR QL AUTO: ABNORMAL
SP GR UR STRIP.AUTO: 1.02
UROBILINOGEN UR STRIP.AUTO-MCNC: 0.2 MG/DL
WBC #/AREA URNS HPF: ABNORMAL /HPF

## 2023-01-30 ENCOUNTER — APPOINTMENT (OUTPATIENT)
Dept: MEDICAL GROUP | Facility: MEDICAL CENTER | Age: 63
End: 2023-01-30
Payer: COMMERCIAL

## 2023-02-01 ENCOUNTER — HOSPITAL ENCOUNTER (OUTPATIENT)
Facility: MEDICAL CENTER | Age: 63
End: 2023-02-01
Attending: UROLOGY
Payer: COMMERCIAL

## 2023-02-01 PROCEDURE — 87086 URINE CULTURE/COLONY COUNT: CPT

## 2023-02-03 LAB
BACTERIA UR CULT: NORMAL
SIGNIFICANT IND 70042: NORMAL
SITE SITE: NORMAL
SOURCE SOURCE: NORMAL

## 2023-02-24 ENCOUNTER — PATIENT MESSAGE (OUTPATIENT)
Dept: HEALTH INFORMATION MANAGEMENT | Facility: OTHER | Age: 63
End: 2023-02-24

## 2023-03-03 DIAGNOSIS — B37.2 INTERTRIGINOUS CANDIDIASIS: ICD-10-CM

## 2023-03-03 RX ORDER — CLOTRIMAZOLE AND BETAMETHASONE DIPROPIONATE 10; .64 MG/G; MG/G
1 CREAM TOPICAL 2 TIMES DAILY
Qty: 45 G | Refills: 2 | Status: SHIPPED | OUTPATIENT
Start: 2023-03-03 | End: 2023-07-21

## 2023-03-17 ENCOUNTER — HOSPITAL ENCOUNTER (OUTPATIENT)
Facility: MEDICAL CENTER | Age: 63
End: 2023-03-17
Attending: UROLOGY
Payer: COMMERCIAL

## 2023-03-17 PROCEDURE — 87186 SC STD MICRODIL/AGAR DIL: CPT

## 2023-03-17 PROCEDURE — 87077 CULTURE AEROBIC IDENTIFY: CPT

## 2023-03-17 PROCEDURE — 87086 URINE CULTURE/COLONY COUNT: CPT

## 2023-05-07 ENCOUNTER — OFFICE VISIT (OUTPATIENT)
Dept: URGENT CARE | Facility: CLINIC | Age: 63
End: 2023-05-07
Payer: COMMERCIAL

## 2023-05-07 VITALS
DIASTOLIC BLOOD PRESSURE: 70 MMHG | RESPIRATION RATE: 18 BRPM | TEMPERATURE: 97.3 F | OXYGEN SATURATION: 97 % | WEIGHT: 210 LBS | HEART RATE: 94 BPM | HEIGHT: 66 IN | SYSTOLIC BLOOD PRESSURE: 122 MMHG | BODY MASS INDEX: 33.75 KG/M2

## 2023-05-07 DIAGNOSIS — J00 ACUTE NASOPHARYNGITIS: ICD-10-CM

## 2023-05-07 PROCEDURE — 99213 OFFICE O/P EST LOW 20 MIN: CPT | Performed by: FAMILY MEDICINE

## 2023-05-07 RX ORDER — ACETAMINOPHEN 500 MG
1000 TABLET ORAL EVERY 6 HOURS PRN
COMMUNITY
End: 2023-07-21

## 2023-05-07 RX ORDER — FINASTERIDE 5 MG/1
TABLET, FILM COATED ORAL
COMMUNITY
End: 2023-07-21

## 2023-05-07 ASSESSMENT — ENCOUNTER SYMPTOMS
COUGH: 1
SORE THROAT: 1
CONSTITUTIONAL NEGATIVE: 1

## 2023-05-07 ASSESSMENT — FIBROSIS 4 INDEX: FIB4 SCORE: 1.18430824449154003

## 2023-05-07 NOTE — PROGRESS NOTES
"Subjective:   Hugo Zaman is a 62 y.o. male who presents for Cough (Traveled, x 5 days, had a \"cold\" slight congestion, franck cough, concerned about bronchitis)      Cough  Associated symptoms include a sore throat.     Review of Systems   Constitutional: Negative.    HENT:  Positive for sore throat.    Respiratory:  Positive for cough.      Medications, Allergies, and current problem list reviewed today in Epic.     Objective:     /70 (BP Location: Left arm, Patient Position: Sitting, BP Cuff Size: Adult)   Pulse 94   Temp 36.3 °C (97.3 °F) (Temporal)   Resp 18   Ht 1.676 m (5' 6\")   Wt 95.3 kg (210 lb)   SpO2 97%     Physical Exam  Vitals and nursing note reviewed.   Constitutional:       Appearance: Normal appearance.   HENT:      Right Ear: Tympanic membrane normal.      Left Ear: Tympanic membrane normal.      Nose: Nose normal.      Mouth/Throat:      Pharynx: Posterior oropharyngeal erythema present.   Cardiovascular:      Rate and Rhythm: Normal rate and regular rhythm.      Pulses: Normal pulses.      Heart sounds: Normal heart sounds.   Pulmonary:      Effort: Pulmonary effort is normal.      Breath sounds: Normal breath sounds.   Neurological:      Mental Status: He is alert.       Assessment/Plan:     Diagnosis and associated orders:     1. Acute nasopharyngitis           Comments/MDM:     Otc cough syrup         Differential diagnosis, natural history, supportive care, and indications for immediate follow-up discussed.    Advised the patient to follow-up with the primary care physician for recheck, reevaluation, and consideration of further management.    Please note that this dictation was created using voice recognition software. I have made a reasonable attempt to correct obvious errors, but I expect that there are errors of grammar and possibly content that I did not discover before finalizing the note.    This note was electronically signed by Dhruv Givens M.D.  "

## 2023-07-20 SDOH — ECONOMIC STABILITY: INCOME INSECURITY: IN THE LAST 12 MONTHS, WAS THERE A TIME WHEN YOU WERE NOT ABLE TO PAY THE MORTGAGE OR RENT ON TIME?: NO

## 2023-07-20 SDOH — ECONOMIC STABILITY: FOOD INSECURITY: WITHIN THE PAST 12 MONTHS, THE FOOD YOU BOUGHT JUST DIDN'T LAST AND YOU DIDN'T HAVE MONEY TO GET MORE.: NEVER TRUE

## 2023-07-20 SDOH — HEALTH STABILITY: PHYSICAL HEALTH: ON AVERAGE, HOW MANY MINUTES DO YOU ENGAGE IN EXERCISE AT THIS LEVEL?: 40 MIN

## 2023-07-20 SDOH — ECONOMIC STABILITY: HOUSING INSECURITY
IN THE LAST 12 MONTHS, WAS THERE A TIME WHEN YOU DID NOT HAVE A STEADY PLACE TO SLEEP OR SLEPT IN A SHELTER (INCLUDING NOW)?: NO

## 2023-07-20 SDOH — ECONOMIC STABILITY: TRANSPORTATION INSECURITY
IN THE PAST 12 MONTHS, HAS LACK OF RELIABLE TRANSPORTATION KEPT YOU FROM MEDICAL APPOINTMENTS, MEETINGS, WORK OR FROM GETTING THINGS NEEDED FOR DAILY LIVING?: NO

## 2023-07-20 SDOH — HEALTH STABILITY: PHYSICAL HEALTH: ON AVERAGE, HOW MANY DAYS PER WEEK DO YOU ENGAGE IN MODERATE TO STRENUOUS EXERCISE (LIKE A BRISK WALK)?: 6 DAYS

## 2023-07-20 SDOH — HEALTH STABILITY: MENTAL HEALTH
STRESS IS WHEN SOMEONE FEELS TENSE, NERVOUS, ANXIOUS, OR CAN'T SLEEP AT NIGHT BECAUSE THEIR MIND IS TROUBLED. HOW STRESSED ARE YOU?: NOT AT ALL

## 2023-07-20 SDOH — ECONOMIC STABILITY: FOOD INSECURITY: WITHIN THE PAST 12 MONTHS, YOU WORRIED THAT YOUR FOOD WOULD RUN OUT BEFORE YOU GOT MONEY TO BUY MORE.: NEVER TRUE

## 2023-07-20 SDOH — ECONOMIC STABILITY: INCOME INSECURITY: HOW HARD IS IT FOR YOU TO PAY FOR THE VERY BASICS LIKE FOOD, HOUSING, MEDICAL CARE, AND HEATING?: NOT HARD AT ALL

## 2023-07-20 SDOH — ECONOMIC STABILITY: TRANSPORTATION INSECURITY
IN THE PAST 12 MONTHS, HAS LACK OF TRANSPORTATION KEPT YOU FROM MEETINGS, WORK, OR FROM GETTING THINGS NEEDED FOR DAILY LIVING?: NO

## 2023-07-20 SDOH — ECONOMIC STABILITY: HOUSING INSECURITY: IN THE LAST 12 MONTHS, HOW MANY PLACES HAVE YOU LIVED?: 1

## 2023-07-20 SDOH — ECONOMIC STABILITY: TRANSPORTATION INSECURITY
IN THE PAST 12 MONTHS, HAS THE LACK OF TRANSPORTATION KEPT YOU FROM MEDICAL APPOINTMENTS OR FROM GETTING MEDICATIONS?: NO

## 2023-07-20 ASSESSMENT — SOCIAL DETERMINANTS OF HEALTH (SDOH)
HOW OFTEN DO YOU GET TOGETHER WITH FRIENDS OR RELATIVES?: ONCE A WEEK
HOW OFTEN DO YOU ATTEND CHURCH OR RELIGIOUS SERVICES?: NEVER
HOW OFTEN DO YOU ATTENT MEETINGS OF THE CLUB OR ORGANIZATION YOU BELONG TO?: PATIENT DECLINED
HOW HARD IS IT FOR YOU TO PAY FOR THE VERY BASICS LIKE FOOD, HOUSING, MEDICAL CARE, AND HEATING?: NOT HARD AT ALL
HOW OFTEN DO YOU GET TOGETHER WITH FRIENDS OR RELATIVES?: ONCE A WEEK
IN A TYPICAL WEEK, HOW MANY TIMES DO YOU TALK ON THE PHONE WITH FAMILY, FRIENDS, OR NEIGHBORS?: MORE THAN THREE TIMES A WEEK
HOW OFTEN DO YOU HAVE A DRINK CONTAINING ALCOHOL: 2-4 TIMES A MONTH
WITHIN THE PAST 12 MONTHS, YOU WORRIED THAT YOUR FOOD WOULD RUN OUT BEFORE YOU GOT THE MONEY TO BUY MORE: NEVER TRUE
DO YOU BELONG TO ANY CLUBS OR ORGANIZATIONS SUCH AS CHURCH GROUPS UNIONS, FRATERNAL OR ATHLETIC GROUPS, OR SCHOOL GROUPS?: NO
HOW OFTEN DO YOU ATTENT MEETINGS OF THE CLUB OR ORGANIZATION YOU BELONG TO?: PATIENT DECLINED
DO YOU BELONG TO ANY CLUBS OR ORGANIZATIONS SUCH AS CHURCH GROUPS UNIONS, FRATERNAL OR ATHLETIC GROUPS, OR SCHOOL GROUPS?: NO
HOW MANY DRINKS CONTAINING ALCOHOL DO YOU HAVE ON A TYPICAL DAY WHEN YOU ARE DRINKING: 1 OR 2
IN A TYPICAL WEEK, HOW MANY TIMES DO YOU TALK ON THE PHONE WITH FAMILY, FRIENDS, OR NEIGHBORS?: MORE THAN THREE TIMES A WEEK
HOW OFTEN DO YOU HAVE SIX OR MORE DRINKS ON ONE OCCASION: NEVER
HOW OFTEN DO YOU ATTEND CHURCH OR RELIGIOUS SERVICES?: NEVER

## 2023-07-20 ASSESSMENT — LIFESTYLE VARIABLES
HOW MANY STANDARD DRINKS CONTAINING ALCOHOL DO YOU HAVE ON A TYPICAL DAY: 1 OR 2
AUDIT-C TOTAL SCORE: 2
HOW OFTEN DO YOU HAVE A DRINK CONTAINING ALCOHOL: 2-4 TIMES A MONTH
SKIP TO QUESTIONS 9-10: 1
HOW OFTEN DO YOU HAVE SIX OR MORE DRINKS ON ONE OCCASION: NEVER

## 2023-07-21 ENCOUNTER — OFFICE VISIT (OUTPATIENT)
Dept: MEDICAL GROUP | Facility: MEDICAL CENTER | Age: 63
End: 2023-07-21
Payer: COMMERCIAL

## 2023-07-21 VITALS
OXYGEN SATURATION: 97 % | TEMPERATURE: 97.2 F | HEIGHT: 66 IN | WEIGHT: 211.64 LBS | RESPIRATION RATE: 16 BRPM | HEART RATE: 102 BPM | DIASTOLIC BLOOD PRESSURE: 80 MMHG | BODY MASS INDEX: 34.01 KG/M2 | SYSTOLIC BLOOD PRESSURE: 128 MMHG

## 2023-07-21 DIAGNOSIS — R05.3 CHRONIC COUGH: ICD-10-CM

## 2023-07-21 DIAGNOSIS — Z12.11 COLON CANCER SCREENING: ICD-10-CM

## 2023-07-21 DIAGNOSIS — E66.09 CLASS 1 OBESITY DUE TO EXCESS CALORIES WITHOUT SERIOUS COMORBIDITY WITH BODY MASS INDEX (BMI) OF 34.0 TO 34.9 IN ADULT: ICD-10-CM

## 2023-07-21 DIAGNOSIS — Z90.79 S/P PROSTATECTOMY: ICD-10-CM

## 2023-07-21 DIAGNOSIS — R63.2 BINGE EATING: ICD-10-CM

## 2023-07-21 DIAGNOSIS — K21.9 GASTROESOPHAGEAL REFLUX DISEASE, UNSPECIFIED WHETHER ESOPHAGITIS PRESENT: ICD-10-CM

## 2023-07-21 DIAGNOSIS — Z00.00 ENCOUNTER FOR MEDICAL EXAMINATION TO ESTABLISH CARE: ICD-10-CM

## 2023-07-21 PROBLEM — B37.2 INTERTRIGINOUS CANDIDIASIS: Status: RESOLVED | Noted: 2022-04-26 | Resolved: 2023-07-21

## 2023-07-21 PROBLEM — R97.20 ELEVATED PSA: Status: RESOLVED | Noted: 2022-04-26 | Resolved: 2023-07-21

## 2023-07-21 PROBLEM — R73.02 IGT (IMPAIRED GLUCOSE TOLERANCE): Status: RESOLVED | Noted: 2022-04-26 | Resolved: 2023-07-21

## 2023-07-21 PROBLEM — E66.9 OBESITY (BMI 30.0-34.9): Status: RESOLVED | Noted: 2022-04-26 | Resolved: 2023-07-21

## 2023-07-21 PROBLEM — N40.0 BENIGN PROSTATIC HYPERPLASIA WITHOUT LOWER URINARY TRACT SYMPTOMS: Status: RESOLVED | Noted: 2022-04-26 | Resolved: 2023-07-21

## 2023-07-21 PROBLEM — N32.0 BLADDER OUTLET OBSTRUCTION: Status: RESOLVED | Noted: 2022-12-15 | Resolved: 2023-07-21

## 2023-07-21 PROCEDURE — 3074F SYST BP LT 130 MM HG: CPT | Performed by: STUDENT IN AN ORGANIZED HEALTH CARE EDUCATION/TRAINING PROGRAM

## 2023-07-21 PROCEDURE — 3079F DIAST BP 80-89 MM HG: CPT | Performed by: STUDENT IN AN ORGANIZED HEALTH CARE EDUCATION/TRAINING PROGRAM

## 2023-07-21 PROCEDURE — 99214 OFFICE O/P EST MOD 30 MIN: CPT | Performed by: STUDENT IN AN ORGANIZED HEALTH CARE EDUCATION/TRAINING PROGRAM

## 2023-07-21 RX ORDER — TOPIRAMATE SPINKLE 25 MG/1
25 CAPSULE ORAL NIGHTLY
Qty: 30 CAPSULE | Refills: 0 | Status: SHIPPED | OUTPATIENT
Start: 2023-07-21 | End: 2023-07-23

## 2023-07-21 ASSESSMENT — FIBROSIS 4 INDEX: FIB4 SCORE: 1.18430824449154003

## 2023-07-21 NOTE — LETTER
[facility]  Ashley Kincaid M.D.  [address]  [place] [address]  Fax: [phone]   Authorization for Release/Disclosure of   Protected Health Information   Name: NILAM TOWNSEND : 1960 SSN: [id number]   Address: [address] [place] [address] [address]  [place] [address] Phone:    [phone] (home) [phone] (work)   I authorize the entity listed below to release/disclose the PHI below to:   [facility]/Ashley Kincaid M.D. and Ashley Kincaid M.D.   Provider or Entity Name:  [facility]   Address   City, [facility], [place], [place] Phone:   [phone]    Fax:     Reason for request: continuity of care   Information to be released:    [  ] LAST COLONOSCOPY,  including any PATH REPORT and follow-up  [  ] LAST FIT/COLOGUARD RESULT [  ] LAST DEXA  [  ] LAST MAMMOGRAM  [  ] LAST PAP  [  ] LAST LABS [  ] RETINA EXAM REPORT  [  ] IMMUNIZATION RECORDS  [  ] Release all info      [  ] Check here and initial the line next to each item to release ALL health information INCLUDING  _____ Care and treatment for drug and / or alcohol abuse  _____ HIV testing, infection status, or AIDS  _____ Genetic Testing    DATES OF SERVICE OR TIME PERIOD TO BE DISCLOSED: _____________  I understand and acknowledge that:  * This Authorization may be revoked at any time by you in writing, except if your health information has already been used or disclosed.  * Your health information that will be used or disclosed as a result of you signing this authorization could be re-disclosed by the recipient. If this occurs, your re-disclosed health information may no longer be protected by State or Federal laws.  * You may refuse to sign this Authorization. Your refusal will not affect your ability to obtain treatment.  * This Authorization becomes effective upon signing and will  on (date) __________.      If no date is indicated, this Authorization will  one (1) year from the signature date.     Name: Hugo Zaman  Signature: Date:   7/21/2023     PLEASE FAX REQUESTED RECORDS BACK TO: [phone]

## 2023-07-21 NOTE — PROGRESS NOTES
Subjective:     CC:  Diagnoses of Encounter for medical examination to establish care, Gastroesophageal reflux disease, unspecified whether esophagitis present, Chronic cough, S/P prostatectomy, Binge eating, Colon cancer screening, and Class 1 obesity due to excess calories without serious comorbidity with body mass index (BMI) of 34.0 to 34.9 in adult were pertinent to this visit.    HISTORY OF THE PRESENT ILLNESS: Patient is a 62 y.o. male. This pleasant patient is here today to establish care and discuss the following    Problem   Chronic Cough    This is a chronic condition.  He has had a chronic cough that initially was thought to be due to GERD.  He has had an endoscopy that was normal and he also had a barium swallow that was normal.  It was recommended that he follow-up with ENT for further work-up.     S/P Prostatectomy    Patient had a prostatectomy in December 2022.  Since then on hot days he still has blood in his urine.  He is following with urology.     Binge Eating    This is a chronic condition.  Patient states that he struggles with binge eating.  He states that he does not feel hungry but will overeat and it feels like a compulsion to keep eating.  He is hoping there is a medication that can help him with this as he has tried many different ways to lose weight including group settings, hypnosis, calorie restriction etc.     Class 1 Obesity Due to Excess Calories Without Serious Comorbidity With Body Mass Index (Bmi) of 34.0 to 34.9 in Adult   Gastroesophageal Reflux Disease    This is a chronic condition, takes Nexium as needed     Bladder Outlet Obstruction (Resolved)   Benign Prostatic Hyperplasia Without Lower Urinary Tract Symptoms (Resolved)   Intertriginous Candidiasis (Resolved)   Elevated Psa (Resolved)   Igt (Impaired Glucose Tolerance) (Resolved)   Obesity (Bmi 30.0-34.9) (Resolved)     ROS:   ROS      Objective:     Exam: /80   Pulse (!) 102   Temp 36.2 °C (97.2 °F) (Temporal)   " Resp 16   Ht 1.676 m (5' 6\")   Wt 96 kg (211 lb 10.3 oz)   SpO2 97%  Body mass index is 34.16 kg/m².    Physical Exam  Vitals reviewed.   Constitutional:       General: He is not in acute distress.     Appearance: He is not toxic-appearing.   HENT:      Head: Normocephalic and atraumatic.      Right Ear: External ear normal.      Left Ear: External ear normal.   Eyes:      General:         Right eye: No discharge.         Left eye: No discharge.      Extraocular Movements: Extraocular movements intact.      Conjunctiva/sclera: Conjunctivae normal.   Cardiovascular:      Rate and Rhythm: Normal rate and regular rhythm.      Heart sounds: Normal heart sounds. No murmur heard.     No gallop.   Pulmonary:      Effort: Pulmonary effort is normal. No respiratory distress.      Breath sounds: Normal breath sounds. No wheezing or rales.   Skin:     General: Skin is warm and dry.   Neurological:      Mental Status: He is alert.   Psychiatric:         Mood and Affect: Mood normal.         Behavior: Behavior normal.         Thought Content: Thought content normal.         Judgment: Judgment normal.           Assessment & Plan:   62 y.o. male with the following -    1. Encounter for medical examination to establish care  History, problem list, medications and allergies reviewed.  Records requested from previous provider if applicable.    2. Gastroesophageal reflux disease, unspecified whether esophagitis present  Chronic, status post endoscopy and barium swallow, continue Nexium as needed    3. Chronic cough  He has had a complete work-up through GI that has not shown any cause of his symptoms, referral to ENT sent  - Referral to ENT    4. S/P prostatectomy  Encourage patient to reach out to urologist regarding the continued blood in his urine since his prostatectomy was over 6 months ago.  - Referral to Urology    5. Binge eating  Discussed Topamax as a treatment for binge eating disorder.  Discussed side effects of " drowsiness or dizziness.  Discussed the risk of severe drug reaction.  Follow-up in 1 month.  - topiramate (TOPAMAX) 25 MG capsule; Take 1 Capsule by mouth every evening.  Dispense: 30 Capsule; Refill: 0    6. Colon cancer screening  - Referral to GI for Colonoscopy    7. Class 1 obesity due to excess calories without serious comorbidity with body mass index (BMI) of 34.0 to 34.9 in adult        No follow-ups on file.    Please note that this dictation was created using voice recognition software. I have made every reasonable attempt to correct obvious errors, but I expect that there are errors of grammar and possibly content that I did not discover before finalizing the note.

## 2023-07-22 NOTE — TELEPHONE ENCOUNTER
Received request via: Pharmacy    Was the patient seen in the last year in this department? Yes    Does the patient have an active prescription (recently filled or refills available) for medication(s) requested? no    Does the patient have FDC Plus and need 100 day supply (blood pressure, diabetes and cholesterol meds only)? Patient does not have SCP

## 2023-07-23 RX ORDER — TOPIRAMATE SPINKLE 25 MG/1
25 CAPSULE ORAL NIGHTLY
Qty: 90 CAPSULE | Refills: 0 | Status: SHIPPED | OUTPATIENT
Start: 2023-07-23 | End: 2023-08-18

## 2023-08-21 ENCOUNTER — PATIENT MESSAGE (OUTPATIENT)
Dept: MEDICAL GROUP | Facility: MEDICAL CENTER | Age: 63
End: 2023-08-21
Payer: MEDICARE

## 2023-08-24 ENCOUNTER — OFFICE VISIT (OUTPATIENT)
Dept: MEDICAL GROUP | Facility: MEDICAL CENTER | Age: 63
End: 2023-08-24
Payer: COMMERCIAL

## 2023-08-24 VITALS
BODY MASS INDEX: 34.87 KG/M2 | WEIGHT: 217 LBS | TEMPERATURE: 97.2 F | HEIGHT: 66 IN | RESPIRATION RATE: 16 BRPM | HEART RATE: 84 BPM | OXYGEN SATURATION: 98 % | DIASTOLIC BLOOD PRESSURE: 72 MMHG | SYSTOLIC BLOOD PRESSURE: 134 MMHG

## 2023-08-24 DIAGNOSIS — Z23 IMMUNIZATION DUE: ICD-10-CM

## 2023-08-24 DIAGNOSIS — R05.3 CHRONIC COUGH: ICD-10-CM

## 2023-08-24 DIAGNOSIS — E66.09 CLASS 1 OBESITY DUE TO EXCESS CALORIES WITHOUT SERIOUS COMORBIDITY WITH BODY MASS INDEX (BMI) OF 34.0 TO 34.9 IN ADULT: ICD-10-CM

## 2023-08-24 DIAGNOSIS — M65.351 TRIGGER LITTLE FINGER OF RIGHT HAND: ICD-10-CM

## 2023-08-24 PROCEDURE — 3075F SYST BP GE 130 - 139MM HG: CPT | Performed by: STUDENT IN AN ORGANIZED HEALTH CARE EDUCATION/TRAINING PROGRAM

## 2023-08-24 PROCEDURE — 90677 PCV20 VACCINE IM: CPT | Performed by: STUDENT IN AN ORGANIZED HEALTH CARE EDUCATION/TRAINING PROGRAM

## 2023-08-24 PROCEDURE — 3078F DIAST BP <80 MM HG: CPT | Performed by: STUDENT IN AN ORGANIZED HEALTH CARE EDUCATION/TRAINING PROGRAM

## 2023-08-24 PROCEDURE — 90471 IMMUNIZATION ADMIN: CPT | Performed by: STUDENT IN AN ORGANIZED HEALTH CARE EDUCATION/TRAINING PROGRAM

## 2023-08-24 PROCEDURE — 99214 OFFICE O/P EST MOD 30 MIN: CPT | Mod: 25 | Performed by: STUDENT IN AN ORGANIZED HEALTH CARE EDUCATION/TRAINING PROGRAM

## 2023-08-24 RX ORDER — FINASTERIDE 5 MG/1
1 TABLET, FILM COATED ORAL
COMMUNITY
End: 2023-08-24

## 2023-08-24 RX ORDER — POLYETHYLENE GLYCOL-3350 AND ELECTROLYTES 236; 6.74; 5.86; 2.97; 22.74 G/274.31G; G/274.31G; G/274.31G; G/274.31G; G/274.31G
POWDER, FOR SOLUTION ORAL
COMMUNITY
Start: 2023-08-10 | End: 2023-08-24

## 2023-08-24 RX ORDER — AMITRIPTYLINE HYDROCHLORIDE 10 MG/1
10 TABLET, FILM COATED ORAL
COMMUNITY
Start: 2023-08-22 | End: 2023-12-28

## 2023-08-24 RX ORDER — NITROFURANTOIN 25; 75 MG/1; MG/1
1 CAPSULE ORAL 2 TIMES DAILY
COMMUNITY
End: 2023-08-24

## 2023-08-24 ASSESSMENT — FIBROSIS 4 INDEX: FIB4 SCORE: 1.202813060811720343

## 2023-08-24 NOTE — PROGRESS NOTES
"Subjective:     CC: trigger finger, obesity, chronic cough    HPI:   Hugo presents today with    Problem   Trigger Little Finger of Right Hand    Trigger finger on the right hand.  This is not bothering him too much and does not want any further management at this time     Chronic Cough    This is a chronic condition.  He has had a chronic cough that initially was thought to be due to GERD.  He has had an endoscopy that was normal and he also had a barium swallow that was normal.  It was recommended that he follow-up with ENT for further work-up, he has followed up with ENT and they started him on amitriptyline     Class 1 Obesity Due to Excess Calories Without Serious Comorbidity With Body Mass Index (Bmi) of 34.0 to 34.9 in Adult    This is a chronic condition.  He recently tried Topamax for a month to help with binge eating and weight loss.  He did not notice any difference on this medication so has stopped.  For now he would like to continue on his own.       ROS:  ROS    Objective:     Exam:  /72   Pulse 84   Temp 36.2 °C (97.2 °F) (Temporal)   Resp 16   Ht 1.676 m (5' 6\")   Wt 98.4 kg (217 lb)   SpO2 98%   BMI 35.02 kg/m²  Body mass index is 35.02 kg/m².    Physical Exam  Vitals reviewed.   Constitutional:       General: He is not in acute distress.     Appearance: He is not toxic-appearing.      Comments: Exam through observation only   HENT:      Head: Normocephalic and atraumatic.      Right Ear: External ear normal.      Left Ear: External ear normal.   Eyes:      General:         Right eye: No discharge.         Left eye: No discharge.      Extraocular Movements: Extraocular movements intact.      Conjunctiva/sclera: Conjunctivae normal.   Pulmonary:      Effort: Pulmonary effort is normal. No respiratory distress.   Skin:     General: Skin is warm and dry.   Neurological:      Mental Status: He is alert.   Psychiatric:         Mood and Affect: Mood normal.         Behavior: Behavior " normal.         Thought Content: Thought content normal.         Judgment: Judgment normal.         Assessment & Plan:     63 y.o. male with the following -     1. Class 1 obesity due to excess calories without serious comorbidity with body mass index (BMI) of 34.0 to 34.9 in adult  Discussed the possibility of going to [facility] [employer] for further management.  For now he would like to hold off on any referrals    2. Chronic cough  Continue amitriptyline, continue follow-up with ENT    3. Trigger little finger of right hand  Discussed the possibility of a referral to a hand surgeon for possible injection, for now he would like to monitor    4. Immunization due  - Pneumococcal Conjugate Vaccine 20-Valent (19 yrs+)    No follow-ups on file.    Please note that this dictation was created using voice recognition software. I have made every reasonable attempt to correct obvious errors, but I expect that there are errors of grammar and possibly content that I did not discover before finalizing the note.

## 2023-12-28 ENCOUNTER — OFFICE VISIT (OUTPATIENT)
Dept: MEDICAL GROUP | Facility: MEDICAL CENTER | Age: 63
End: 2023-12-28
Payer: MEDICARE

## 2023-12-28 VITALS
HEIGHT: 66 IN | DIASTOLIC BLOOD PRESSURE: 68 MMHG | HEART RATE: 85 BPM | TEMPERATURE: 96.8 F | WEIGHT: 216 LBS | SYSTOLIC BLOOD PRESSURE: 126 MMHG | RESPIRATION RATE: 16 BRPM | BODY MASS INDEX: 34.72 KG/M2 | OXYGEN SATURATION: 98 %

## 2023-12-28 DIAGNOSIS — R63.5 WEIGHT GAIN: ICD-10-CM

## 2023-12-28 DIAGNOSIS — R10.12 LUQ PAIN: ICD-10-CM

## 2023-12-28 PROCEDURE — 99214 OFFICE O/P EST MOD 30 MIN: CPT | Performed by: STUDENT IN AN ORGANIZED HEALTH CARE EDUCATION/TRAINING PROGRAM

## 2023-12-28 PROCEDURE — 3074F SYST BP LT 130 MM HG: CPT | Performed by: STUDENT IN AN ORGANIZED HEALTH CARE EDUCATION/TRAINING PROGRAM

## 2023-12-28 PROCEDURE — 3078F DIAST BP <80 MM HG: CPT | Performed by: STUDENT IN AN ORGANIZED HEALTH CARE EDUCATION/TRAINING PROGRAM

## 2023-12-28 ASSESSMENT — FIBROSIS 4 INDEX: FIB4 SCORE: 1.202813060811720343

## 2023-12-28 NOTE — PROGRESS NOTES
"Subjective:     CC: LUQ pain    HPI:   Hugo presents today with    Problem   Luq Pain    This is a new condition that started about a week ago.  He has intermittent pressure sensation in the left upper quadrant.  He does not have any of the sensation in the chest or shoulder.  No shortness of breath.  Nothing reliably brings on.  No changes with eating.  Pain is not reproducible with palpation.       ROS:  ROS    Objective:     Exam:  /68   Pulse 85   Temp 36 °C (96.8 °F) (Temporal)   Resp 16   Ht 1.676 m (5' 6\")   Wt 98 kg (216 lb)   SpO2 98%   BMI 34.86 kg/m²  Body mass index is 34.86 kg/m².    Physical Exam  Vitals reviewed.   Constitutional:       General: He is not in acute distress.     Appearance: He is not toxic-appearing.   HENT:      Head: Normocephalic and atraumatic.      Right Ear: External ear normal.      Left Ear: External ear normal.   Eyes:      General:         Right eye: No discharge.         Left eye: No discharge.      Extraocular Movements: Extraocular movements intact.      Conjunctiva/sclera: Conjunctivae normal.   Pulmonary:      Effort: Pulmonary effort is normal. No respiratory distress.   Abdominal:      General: There is no distension.      Palpations: There is no mass.      Tenderness: There is no abdominal tenderness. There is no rebound.      Hernia: No hernia is present.   Skin:     General: Skin is warm and dry.   Neurological:      Mental Status: He is alert.   Psychiatric:         Mood and Affect: Mood normal.         Behavior: Behavior normal.         Thought Content: Thought content normal.         Judgment: Judgment normal.         Assessment & Plan:     63 y.o. male with the following -     1. Weight gain  He has had weight gain and has not been able to lose it, discussed getting a TSH  - TSH WITH REFLEX TO FT4; Future    2. LUQ pain  This is a new condition.  We discussed that since he is getting abdominal imaging this is really can to be the best way to look " and make sure there is no injury or issue with any of the left upper quadrant organs.  He does not have any chest pain or shortness of breath we discussed if he does develop the symptoms that he will need further evaluation.  I also told patient to try an antacid to see if this will help symptoms.      No follow-ups on file.    Please note that this dictation was created using voice recognition software. I have made every reasonable attempt to correct obvious errors, but I expect that there are errors of grammar and possibly content that I did not discover before finalizing the note.

## 2024-01-09 ENCOUNTER — HOSPITAL ENCOUNTER (OUTPATIENT)
Dept: RADIOLOGY | Facility: MEDICAL CENTER | Age: 64
End: 2024-01-09
Attending: UROLOGY
Payer: MEDICARE

## 2024-01-09 ENCOUNTER — HOSPITAL ENCOUNTER (OUTPATIENT)
Dept: LAB | Facility: MEDICAL CENTER | Age: 64
End: 2024-01-09
Attending: STUDENT IN AN ORGANIZED HEALTH CARE EDUCATION/TRAINING PROGRAM
Payer: MEDICARE

## 2024-01-09 ENCOUNTER — PATIENT MESSAGE (OUTPATIENT)
Dept: MEDICAL GROUP | Facility: MEDICAL CENTER | Age: 64
End: 2024-01-09
Payer: MEDICARE

## 2024-01-09 ENCOUNTER — HOSPITAL ENCOUNTER (OUTPATIENT)
Dept: LAB | Facility: MEDICAL CENTER | Age: 64
End: 2024-01-09
Attending: UROLOGY
Payer: MEDICARE

## 2024-01-09 DIAGNOSIS — Z12.5 ENCOUNTER FOR SCREENING FOR MALIGNANT NEOPLASM OF PROSTATE: ICD-10-CM

## 2024-01-09 DIAGNOSIS — N28.89 OTHER SPECIFIED DISORDERS OF KIDNEY AND URETER: ICD-10-CM

## 2024-01-09 DIAGNOSIS — R63.5 WEIGHT GAIN: ICD-10-CM

## 2024-01-09 LAB
BUN SERPL-MCNC: 16 MG/DL (ref 8–22)
CREAT SERPL-MCNC: 0.93 MG/DL (ref 0.5–1.4)
GFR SERPLBLD CREATININE-BSD FMLA CKD-EPI: 91 ML/MIN/1.73 M 2
TSH SERPL DL<=0.005 MIU/L-ACNC: 3.69 UIU/ML (ref 0.38–5.33)

## 2024-01-09 PROCEDURE — 74170 CT ABD WO CNTRST FLWD CNTRST: CPT

## 2024-01-09 PROCEDURE — 700117 HCHG RX CONTRAST REV CODE 255: Performed by: UROLOGY

## 2024-01-09 PROCEDURE — 36415 COLL VENOUS BLD VENIPUNCTURE: CPT

## 2024-01-09 PROCEDURE — 84520 ASSAY OF UREA NITROGEN: CPT

## 2024-01-09 PROCEDURE — 82565 ASSAY OF CREATININE: CPT

## 2024-01-09 PROCEDURE — 84443 ASSAY THYROID STIM HORMONE: CPT

## 2024-01-09 RX ADMIN — IOHEXOL 100 ML: 350 INJECTION, SOLUTION INTRAVENOUS at 09:03

## 2024-01-15 ENCOUNTER — HOSPITAL ENCOUNTER (OUTPATIENT)
Dept: LAB | Facility: MEDICAL CENTER | Age: 64
End: 2024-01-15
Attending: STUDENT IN AN ORGANIZED HEALTH CARE EDUCATION/TRAINING PROGRAM
Payer: MEDICARE

## 2024-01-15 DIAGNOSIS — Z12.5 ENCOUNTER FOR SCREENING FOR MALIGNANT NEOPLASM OF PROSTATE: ICD-10-CM

## 2024-01-15 LAB — PSA SERPL-MCNC: 6.13 NG/ML (ref 0–4)

## 2024-01-15 PROCEDURE — 36415 COLL VENOUS BLD VENIPUNCTURE: CPT | Mod: GA

## 2024-01-15 PROCEDURE — 84153 ASSAY OF PSA TOTAL: CPT | Mod: GA

## 2024-01-22 ENCOUNTER — HOSPITAL ENCOUNTER (OUTPATIENT)
Facility: MEDICAL CENTER | Age: 64
End: 2024-01-22
Attending: UROLOGY
Payer: MEDICARE

## 2024-01-22 PROCEDURE — 87086 URINE CULTURE/COLONY COUNT: CPT

## 2024-01-22 PROCEDURE — 87186 SC STD MICRODIL/AGAR DIL: CPT

## 2024-01-22 PROCEDURE — 87077 CULTURE AEROBIC IDENTIFY: CPT

## 2024-01-25 LAB
BACTERIA UR CULT: ABNORMAL
BACTERIA UR CULT: ABNORMAL
SIGNIFICANT IND 70042: ABNORMAL
SITE SITE: ABNORMAL
SOURCE SOURCE: ABNORMAL

## (undated) DEVICE — PACK SINGLE BASIN - (6/CA)

## (undated) DEVICE — ELECTRODE DUAL RETURN W/ CORD - (50/PK)

## (undated) DEVICE — SPONGE GAUZESTER 4 X 4 4PLY - (128PK/CA)

## (undated) DEVICE — GOWN SURGICAL X-LARGE ULTRA - FILM-REINFORCED (20/CA)

## (undated) DEVICE — GOWN WARMING STANDARD FLEX - (30/CA)

## (undated) DEVICE — SYRINGE 30 ML LL (56/BX)

## (undated) DEVICE — TUBE CONNECTING SUCTION - CLEAR PLASTIC STERILE 72 IN (50EA/CA)

## (undated) DEVICE — SET LEADWIRE 5 LEAD BEDSIDE DISPOSABLE ECG (1SET OF 5/EA)

## (undated) DEVICE — JELLY SURGILUBE STERILE TUBE 4.25 OZ (1/EA)

## (undated) DEVICE — SET EPIDURAL MICRO SAPPHIRE EPIDAURAL INFUSION  (1/EA)

## (undated) DEVICE — GLOVE BIOGEL SZ 8 SURGICAL PF LTX - (50PR/BX 4BX/CA)

## (undated) DEVICE — WATER IRRIGATION STERILE 1000ML (12EA/CA)

## (undated) DEVICE — BAG SPONGE COUNT 10.25 X 32 - BLUE (250/CA)

## (undated) DEVICE — TOWEL STOP TIMEOUT SAFETY FLAG (40EA/CA)

## (undated) DEVICE — HUMID-VENT HEAT AND MOISTURE EXCHANGE- (50/BX)

## (undated) DEVICE — BAG DRAINAGE ANTIREFLUX TOWER SLIDE TAP 4000 ML (20EA/CA)

## (undated) DEVICE — CANISTER SUCTION RIGID RED 1500CC (40EA/CA)

## (undated) DEVICE — SLEEVE VASO CALF MED - (10PR/CA)

## (undated) DEVICE — COVER FOOT UNIVERSAL DISP. - (25EA/CA)

## (undated) DEVICE — SENSOR OXIMETER ADULT SPO2 RD SET (20EA/BX)

## (undated) DEVICE — LACTATED RINGERS INJ 1000 ML - (14EA/CA 60CA/PF)

## (undated) DEVICE — SET IRRIGATION CYSTOSCOPY Y-TYPE L81 IN (20EA/CA)

## (undated) DEVICE — SODIUM CHL IRRIGATION 0.9% 1000ML (12EA/CA)

## (undated) DEVICE — SUCTION INSTRUMENT YANKAUER BULBOUS TIP W/O VENT (50EA/CA)

## (undated) DEVICE — GLYCINE IRRIGATION 1.5% - 3000 ML  (4/CS)

## (undated) DEVICE — WATER IRRIG. STER 3000 ML - (4/CA)